# Patient Record
Sex: MALE | Race: WHITE | Employment: OTHER | URBAN - NONMETROPOLITAN AREA
[De-identification: names, ages, dates, MRNs, and addresses within clinical notes are randomized per-mention and may not be internally consistent; named-entity substitution may affect disease eponyms.]

---

## 2018-05-29 ENCOUNTER — HOSPITAL ENCOUNTER (OUTPATIENT)
Dept: CARDIAC REHAB | Age: 63
Setting detail: THERAPIES SERIES
Discharge: HOME | End: 2018-05-29
Payer: MEDICARE

## 2018-05-29 VITALS
HEART RATE: 87 BPM | BODY MASS INDEX: 31.08 KG/M2 | SYSTOLIC BLOOD PRESSURE: 138 MMHG | DIASTOLIC BLOOD PRESSURE: 76 MMHG | HEIGHT: 75 IN | OXYGEN SATURATION: 96 % | WEIGHT: 250 LBS | RESPIRATION RATE: 16 BRPM

## 2018-05-29 ASSESSMENT — PATIENT HEALTH QUESTIONNAIRE - PHQ9: SUM OF ALL RESPONSES TO PHQ QUESTIONS 1-9: 0

## 2018-06-05 ENCOUNTER — HOSPITAL ENCOUNTER (OUTPATIENT)
Dept: CARDIAC REHAB | Age: 63
Setting detail: THERAPIES SERIES
Discharge: HOME | End: 2018-06-05
Payer: OTHER GOVERNMENT

## 2018-06-05 PROCEDURE — 93798 PHYS/QHP OP CAR RHAB W/ECG: CPT

## 2018-06-07 ENCOUNTER — HOSPITAL ENCOUNTER (OUTPATIENT)
Dept: CARDIAC REHAB | Age: 63
Setting detail: THERAPIES SERIES
Discharge: HOME | End: 2018-06-07
Payer: OTHER GOVERNMENT

## 2018-06-07 PROCEDURE — 93798 PHYS/QHP OP CAR RHAB W/ECG: CPT

## 2018-06-12 ENCOUNTER — HOSPITAL ENCOUNTER (OUTPATIENT)
Dept: CARDIAC REHAB | Age: 63
Setting detail: THERAPIES SERIES
Discharge: HOME | End: 2018-06-12
Payer: OTHER GOVERNMENT

## 2018-06-12 PROCEDURE — 93798 PHYS/QHP OP CAR RHAB W/ECG: CPT

## 2018-06-14 ENCOUNTER — HOSPITAL ENCOUNTER (OUTPATIENT)
Dept: CARDIAC REHAB | Age: 63
Setting detail: THERAPIES SERIES
Discharge: HOME | End: 2018-06-14
Payer: OTHER GOVERNMENT

## 2018-06-14 PROCEDURE — 93798 PHYS/QHP OP CAR RHAB W/ECG: CPT

## 2018-06-18 ENCOUNTER — HOSPITAL ENCOUNTER (OUTPATIENT)
Dept: CARDIAC REHAB | Age: 63
Setting detail: THERAPIES SERIES
Discharge: HOME | End: 2018-06-18
Payer: OTHER GOVERNMENT

## 2018-06-18 PROCEDURE — 93798 PHYS/QHP OP CAR RHAB W/ECG: CPT

## 2018-06-20 ENCOUNTER — HOSPITAL ENCOUNTER (OUTPATIENT)
Dept: CARDIAC REHAB | Age: 63
Setting detail: THERAPIES SERIES
Discharge: HOME | End: 2018-06-20
Payer: OTHER GOVERNMENT

## 2018-06-20 PROCEDURE — 93798 PHYS/QHP OP CAR RHAB W/ECG: CPT

## 2018-06-21 ENCOUNTER — HOSPITAL ENCOUNTER (OUTPATIENT)
Dept: CARDIAC REHAB | Age: 63
Setting detail: THERAPIES SERIES
Discharge: HOME | End: 2018-06-21
Payer: OTHER GOVERNMENT

## 2018-06-21 PROCEDURE — 93798 PHYS/QHP OP CAR RHAB W/ECG: CPT

## 2018-06-27 ENCOUNTER — HOSPITAL ENCOUNTER (OUTPATIENT)
Dept: CARDIAC REHAB | Age: 63
Setting detail: THERAPIES SERIES
Discharge: HOME | End: 2018-06-27
Payer: OTHER GOVERNMENT

## 2018-06-27 PROCEDURE — 93798 PHYS/QHP OP CAR RHAB W/ECG: CPT

## 2018-06-28 ENCOUNTER — HOSPITAL ENCOUNTER (OUTPATIENT)
Dept: CARDIAC REHAB | Age: 63
Setting detail: THERAPIES SERIES
Discharge: HOME | End: 2018-06-28
Payer: OTHER GOVERNMENT

## 2018-06-28 PROCEDURE — 93798 PHYS/QHP OP CAR RHAB W/ECG: CPT

## 2018-07-02 ENCOUNTER — HOSPITAL ENCOUNTER (OUTPATIENT)
Dept: CARDIAC REHAB | Age: 63
Setting detail: THERAPIES SERIES
Discharge: HOME | End: 2018-07-02
Payer: OTHER GOVERNMENT

## 2018-07-02 PROCEDURE — 93798 PHYS/QHP OP CAR RHAB W/ECG: CPT

## 2018-07-02 NOTE — PROGRESS NOTES
-increased stamina/strength to 30-50 total exercise by increasing 1-2 level/wk and 1-2   min/wk  to achieve THR and RPE 11-13-pt only at 2.1 METS due to elevated heart rate. Pt had a failed ablation at [facility] and they stopped [facility] Lopressor. [facility] by staff and pateinte. Patient is planning on going to the [address] [place] [address] today.   -introduce weights/ therabands 2-4# for 5-10 reps  -manage BP better-stable  -improved cholesterol and  Triglycerides-no new resutls  -develop regular exercise 30 min daily-pt walking daily.      Physician Changes/Comments:      Cardiac Rehab Staff

## 2018-07-05 ENCOUNTER — HOSPITAL ENCOUNTER (OUTPATIENT)
Dept: CARDIAC REHAB | Age: 63
Setting detail: THERAPIES SERIES
Discharge: HOME | End: 2018-07-05
Payer: OTHER GOVERNMENT

## 2018-07-05 PROCEDURE — 93798 PHYS/QHP OP CAR RHAB W/ECG: CPT

## 2018-07-09 ENCOUNTER — HOSPITAL ENCOUNTER (OUTPATIENT)
Dept: CARDIAC REHAB | Age: 63
Setting detail: THERAPIES SERIES
Discharge: HOME | End: 2018-07-09
Payer: OTHER GOVERNMENT

## 2018-07-09 PROCEDURE — 93798 PHYS/QHP OP CAR RHAB W/ECG: CPT

## 2018-07-11 ENCOUNTER — HOSPITAL ENCOUNTER (OUTPATIENT)
Dept: CARDIAC REHAB | Age: 63
Setting detail: THERAPIES SERIES
Discharge: HOME | End: 2018-07-11
Payer: OTHER GOVERNMENT

## 2018-07-11 PROCEDURE — 93798 PHYS/QHP OP CAR RHAB W/ECG: CPT

## 2018-07-12 ENCOUNTER — HOSPITAL ENCOUNTER (OUTPATIENT)
Dept: CARDIAC REHAB | Age: 63
Setting detail: THERAPIES SERIES
Discharge: HOME | End: 2018-07-12
Payer: OTHER GOVERNMENT

## 2018-07-12 PROCEDURE — 93798 PHYS/QHP OP CAR RHAB W/ECG: CPT

## 2018-07-16 ENCOUNTER — HOSPITAL ENCOUNTER (OUTPATIENT)
Dept: CARDIAC REHAB | Age: 63
Setting detail: THERAPIES SERIES
Discharge: HOME | End: 2018-07-16
Payer: OTHER GOVERNMENT

## 2018-07-16 PROCEDURE — 93798 PHYS/QHP OP CAR RHAB W/ECG: CPT

## 2018-07-18 ENCOUNTER — HOSPITAL ENCOUNTER (OUTPATIENT)
Dept: CARDIAC REHAB | Age: 63
Setting detail: THERAPIES SERIES
Discharge: HOME | End: 2018-07-18
Payer: OTHER GOVERNMENT

## 2018-07-18 PROCEDURE — 93798 PHYS/QHP OP CAR RHAB W/ECG: CPT

## 2018-07-19 ENCOUNTER — HOSPITAL ENCOUNTER (OUTPATIENT)
Dept: CARDIAC REHAB | Age: 63
Setting detail: THERAPIES SERIES
Discharge: HOME | End: 2018-07-19
Payer: OTHER GOVERNMENT

## 2018-07-19 PROCEDURE — 93798 PHYS/QHP OP CAR RHAB W/ECG: CPT

## 2018-07-23 ENCOUNTER — HOSPITAL ENCOUNTER (OUTPATIENT)
Dept: CARDIAC REHAB | Age: 63
Setting detail: THERAPIES SERIES
Discharge: HOME | End: 2018-07-23
Payer: OTHER GOVERNMENT

## 2018-07-23 PROCEDURE — 93798 PHYS/QHP OP CAR RHAB W/ECG: CPT

## 2018-07-25 ENCOUNTER — HOSPITAL ENCOUNTER (OUTPATIENT)
Dept: CARDIAC REHAB | Age: 63
Setting detail: THERAPIES SERIES
Discharge: HOME | End: 2018-07-25
Payer: OTHER GOVERNMENT

## 2018-07-25 PROCEDURE — 93798 PHYS/QHP OP CAR RHAB W/ECG: CPT

## 2018-07-26 ENCOUNTER — HOSPITAL ENCOUNTER (OUTPATIENT)
Dept: CARDIAC REHAB | Age: 63
Setting detail: THERAPIES SERIES
Discharge: HOME | End: 2018-07-26
Payer: OTHER GOVERNMENT

## 2018-07-26 PROCEDURE — 9900000060

## 2018-07-30 ENCOUNTER — HOSPITAL ENCOUNTER (OUTPATIENT)
Dept: CARDIAC REHAB | Age: 63
Setting detail: THERAPIES SERIES
Discharge: HOME | End: 2018-07-30
Payer: OTHER GOVERNMENT

## 2018-07-30 PROCEDURE — 93798 PHYS/QHP OP CAR RHAB W/ECG: CPT

## 2018-07-30 NOTE — PROGRESS NOTES
Phase II Cardiac Rehab Individualized Treatment Plan-60 Day     Patient Name: Prema Brown  Date of Initial Assessment: 7/30/2018  ACCOUNT #: [de-identified]  Diagnosis: Combined systolic and diastolic CHF   Onset Date: 01/01/18  Referring Physician: Trevor Kim  Risk Stratification: High  Session Number: 21       EXERCISE    Stages of Change:   [] pre-contemplation   [x] Action   [] Contemplate   [] Maintainence   [x] Prep   [] Relapse          Exercise Prescription:  Mode: [x] TM [x] B [x] STP [] EL [x] R  Frequency: 3 x week  Duration: 31-60 min   Intensity: METS 2.7  Plan/Goal: Increase 1-2 levels/week or 1-2 min/week to achieve target HR and RPE   11-13. Progression: Progressed from 2.1 to 2.7. Has been klimited due to elevated heart rate. [facility] not[facility], no new orders           Target HR     Maximum      [] Angina with Exertion THR:    [] Resistance Training Weight (lbs):  2 Reps: 10-15    Hypertension:  [x] Yes  [] No  Resting BP: 148/82  Peak Exercise BP: 150/82  [] Med change? Intervention:  Home Exercise:  Type: Walking   Duration: 30 min   Frequency: Daily    [x] Resistance Training    Education:   [x] Equipment Naper  [x] Self pulse   [x] Proper use weights/therabands   [x] S/S to report  [] Low Na Diet    [x] Warm up/ Cool down  [] BP Medication    [x] RPE Scale   [] Understand BP   [x] Ex Safety   [] Exercise specialist class-Home Exercise       Target Goal:   -Individual Exercise Plan  -BP<140/90 or <130/80 if DM   -Aerobic active 30 + minutes 5-7 days per week    Nutrition    Stages of Change:   [] pre-contemplation   [x] Action   [] Contemplate   [] Maintainence   [x] Prep   [] Relapse    Lipids: Total Cholesterol:  - no new results  Triglycerides:   HDL:   LDL:   [] Med Change? Diabetes:  [] Yes  [x] No  Random BS:  HbA1c:  [] Med Change?     Weight Management:  Wt Goal: 1-2 lbs/wk    Intervention:   [] Dietitian Consult       [] Nurse/Patient Discussion     [] Diet RPE 11-13-pt only at 2.1 METS due to elevated heart rate. Pt had a failed ablation at [address] [facility] and they stopped his Lopressor. [facility] by staff and pateinte.   Patient is planning on going to the [facility] in am.     -introduce weights/ therabands 2-4# for 5-10 reps  -manage BP better-stable  -improved cholesterol and  Triglycerides-no new resutls  -develop regular exercise 30 min daily-pt walking daily.        Physician Changes/Comments:      Cardiac Rehab Staff

## 2018-08-01 ENCOUNTER — HOSPITAL ENCOUNTER (OUTPATIENT)
Dept: CARDIAC REHAB | Age: 63
Setting detail: THERAPIES SERIES
Discharge: HOME | End: 2018-08-01
Payer: OTHER GOVERNMENT

## 2018-08-01 PROCEDURE — 93798 PHYS/QHP OP CAR RHAB W/ECG: CPT

## 2018-08-02 ENCOUNTER — HOSPITAL ENCOUNTER (OUTPATIENT)
Dept: CARDIAC REHAB | Age: 63
Setting detail: THERAPIES SERIES
Discharge: HOME | End: 2018-08-02
Payer: OTHER GOVERNMENT

## 2018-08-02 PROCEDURE — 93798 PHYS/QHP OP CAR RHAB W/ECG: CPT

## 2018-08-06 ENCOUNTER — HOSPITAL ENCOUNTER (OUTPATIENT)
Dept: CARDIAC REHAB | Age: 63
Setting detail: THERAPIES SERIES
Discharge: HOME | End: 2018-08-06
Payer: OTHER GOVERNMENT

## 2018-08-06 PROCEDURE — 93798 PHYS/QHP OP CAR RHAB W/ECG: CPT

## 2018-08-08 ENCOUNTER — HOSPITAL ENCOUNTER (OUTPATIENT)
Dept: CARDIAC REHAB | Age: 63
Setting detail: THERAPIES SERIES
Discharge: HOME | End: 2018-08-08
Payer: OTHER GOVERNMENT

## 2018-08-08 PROCEDURE — 93798 PHYS/QHP OP CAR RHAB W/ECG: CPT

## 2018-08-13 ENCOUNTER — HOSPITAL ENCOUNTER (OUTPATIENT)
Dept: CARDIAC REHAB | Age: 63
Setting detail: THERAPIES SERIES
Discharge: HOME | End: 2018-08-13
Payer: OTHER GOVERNMENT

## 2018-08-13 PROCEDURE — 93798 PHYS/QHP OP CAR RHAB W/ECG: CPT

## 2018-08-15 ENCOUNTER — HOSPITAL ENCOUNTER (OUTPATIENT)
Dept: CARDIAC REHAB | Age: 63
Setting detail: THERAPIES SERIES
Discharge: HOME | End: 2018-08-15
Payer: OTHER GOVERNMENT

## 2018-08-15 PROCEDURE — 93798 PHYS/QHP OP CAR RHAB W/ECG: CPT

## 2018-08-16 ENCOUNTER — HOSPITAL ENCOUNTER (OUTPATIENT)
Dept: CARDIAC REHAB | Age: 63
Setting detail: THERAPIES SERIES
Discharge: HOME | End: 2018-08-16
Payer: OTHER GOVERNMENT

## 2018-08-16 PROCEDURE — 93798 PHYS/QHP OP CAR RHAB W/ECG: CPT

## 2018-08-20 ENCOUNTER — HOSPITAL ENCOUNTER (OUTPATIENT)
Dept: CARDIAC REHAB | Age: 63
Setting detail: THERAPIES SERIES
Discharge: HOME | End: 2018-08-20
Payer: OTHER GOVERNMENT

## 2018-08-20 PROCEDURE — 93798 PHYS/QHP OP CAR RHAB W/ECG: CPT

## 2018-08-22 ENCOUNTER — HOSPITAL ENCOUNTER (OUTPATIENT)
Dept: CARDIAC REHAB | Age: 63
Setting detail: THERAPIES SERIES
Discharge: HOME | End: 2018-08-22
Payer: OTHER GOVERNMENT

## 2018-08-22 PROCEDURE — 93798 PHYS/QHP OP CAR RHAB W/ECG: CPT

## 2018-08-23 ENCOUNTER — HOSPITAL ENCOUNTER (OUTPATIENT)
Dept: CARDIAC REHAB | Age: 63
Setting detail: THERAPIES SERIES
Discharge: HOME | End: 2018-08-23
Payer: OTHER GOVERNMENT

## 2018-08-23 PROCEDURE — 93798 PHYS/QHP OP CAR RHAB W/ECG: CPT

## 2018-08-27 ENCOUNTER — HOSPITAL ENCOUNTER (OUTPATIENT)
Dept: CARDIAC REHAB | Age: 63
Setting detail: THERAPIES SERIES
Discharge: HOME | End: 2018-08-27
Payer: OTHER GOVERNMENT

## 2018-08-27 PROCEDURE — 93798 PHYS/QHP OP CAR RHAB W/ECG: CPT

## 2018-08-28 ENCOUNTER — OFFICE VISIT (OUTPATIENT)
Dept: PODIATRY | Age: 63
End: 2018-08-28
Payer: MEDICARE

## 2018-08-28 ENCOUNTER — HOSPITAL ENCOUNTER (OUTPATIENT)
Age: 63
Discharge: HOME | End: 2018-08-30
Payer: OTHER GOVERNMENT

## 2018-08-28 ENCOUNTER — HOSPITAL ENCOUNTER (OUTPATIENT)
Dept: GENERAL RADIOLOGY | Age: 63
Discharge: HOME | End: 2018-08-30
Payer: OTHER GOVERNMENT

## 2018-08-28 VITALS
TEMPERATURE: 98.1 F | BODY MASS INDEX: 32.05 KG/M2 | HEIGHT: 75 IN | DIASTOLIC BLOOD PRESSURE: 78 MMHG | RESPIRATION RATE: 18 BRPM | SYSTOLIC BLOOD PRESSURE: 132 MMHG | HEART RATE: 99 BPM

## 2018-08-28 DIAGNOSIS — M79.671 PAIN OF RIGHT HEEL: ICD-10-CM

## 2018-08-28 DIAGNOSIS — M79.671 PAIN OF RIGHT HEEL: Primary | ICD-10-CM

## 2018-08-28 DIAGNOSIS — M77.31 HEEL SPUR, RIGHT: ICD-10-CM

## 2018-08-28 PROCEDURE — G8427 DOCREV CUR MEDS BY ELIG CLIN: HCPCS | Performed by: PODIATRIST

## 2018-08-28 PROCEDURE — 99203 OFFICE O/P NEW LOW 30 MIN: CPT | Performed by: PODIATRIST

## 2018-08-28 PROCEDURE — G8417 CALC BMI ABV UP PARAM F/U: HCPCS | Performed by: PODIATRIST

## 2018-08-28 PROCEDURE — 73630 X-RAY EXAM OF FOOT: CPT

## 2018-08-28 PROCEDURE — 1036F TOBACCO NON-USER: CPT | Performed by: PODIATRIST

## 2018-08-28 PROCEDURE — 3017F COLORECTAL CA SCREEN DOC REV: CPT | Performed by: PODIATRIST

## 2018-08-28 NOTE — PROGRESS NOTES
Subjective:      Patient ID: Waldo Alejandro is a 57 y.o. male. Cc: Right heel -- > 3 months        (NLDOCAT)  burning / tearing                               OOB /API , now all day   HPI previous insoles          Changed 3 months ago ; no response          Overall ; non traumatic insidious onset          Ice / stretching hx     Review of Systems -constitutional                                   -DM / -HTN                                   +valve replacement ; Coumadin therapy       MRR:   Past Medical History:   Diagnosis Date    Abnormal echocardiogram 3/12, 9/17/12    at least mild-moderate prosthetic mitral valve stenosis. mean gradient of 9-13 mm mercury. Mild MR. EF 60-65%.  CHF (congestive heart failure) (HCC)     Essential hypertension     Generalized osteoarthritis of multiple sites     GERD (gastroesophageal reflux disease)     H/O mitral valve replacement with tissue graft 2012    Bovine Valve    Mixed hyperlipidemia     Paroxysmal atrial flutter ([facility] 10/1/12    Spontaneous resolution. History of A. fib surrounding mitral valve surgery.     Sleep apnea     Ulcerative colitis ([facility]      Past Surgical History:   Procedure Laterality Date    CARDIAC SURGERY      HERNIA REPAIR      MITRAL VALVE REPLACEMENT  2012    Bovine    SHOULDER SURGERY Right     right dislocation     Allergies   Allergen Reactions    Statins Support Therapy      Patient states gets a lot of muscle aches       Current Outpatient Prescriptions:     magnesium oxide (MAG-OX) 400 MG tablet, Take 420 mg by mouth every other day, Disp: , Rfl:     predniSONE (DELTASONE) 10 MG tablet, 4 tabs daily for 3 days, 3 tabs daily for 3 days, 2 tabs daily for 3 days, 1 tabs daily for 3 days, Disp: 30 tablet, Rfl: 0    metoprolol tartrate (LOPRESSOR) 25 MG tablet, Take 1 tablet by mouth 2 times daily, Disp: 60 tablet, Rfl: 3    spironolactone (ALDACTONE) 25 MG tablet, Take 25 mg by mouth daily, Disp: , Rfl:     metolazone (ZAROXOLYN) 2.5 MG tablet, Take 2.5 mg by mouth daily, Disp: , Rfl:     vitamin D 1000 units CAPS, Take by mouth, Disp: , Rfl:     b complex vitamins capsule, Take 1 capsule by mouth daily, Disp: , Rfl:     Calcium Polycarbophil (FIBER-CAPS PO), Take by mouth, Disp: , Rfl:     Lactobacillus (PROBIOTIC ACIDOPHILUS PO), Take by mouth, Disp: , Rfl:     warfarin (COUMADIN) 5 MG tablet, 5 mg 5 days weekly and 7,5 mg 2 days weekly. , Disp: 30 tablet, Rfl: 3    furosemide (LASIX) 40 MG tablet, Take 1 tablet by mouth daily, Disp: 60 tablet, Rfl: 3    omeprazole (PRILOSEC) 20 MG capsule, Take 20 mg by mouth daily. , Disp: , Rfl:     sulfaSALAzine (AZULFIDINE) 500 MG tablet, Take 1,000 mg by mouth 4 times daily. , Disp: , Rfl:   Family History   Problem Relation Age of Onset    Cancer Mother     Diabetes Father      Social History     Social History    Marital status:      Spouse name: N/A    Number of children: N/A    Years of education: N/A     Occupational History    Not on file. Social History Main Topics    Smoking status: Never Smoker    Smokeless tobacco: Never Used    Alcohol use Yes      Comment: ocassional social drinker    Drug use: No    Sexual activity: Yes     Partners: Female     Other Topics Concern    Not on file     Social History Narrative    No narrative on file   +retired ; lives independently @ home   -international travel , < 6 months  -pets      Objective:   Physical Exam 56 Y/O WM , WD/WN, A&O x 3                            VSS, Afebrile, NAD,             LLE ; -Tinel's                        Flexible R.O.M.  Hindfoot                        MS +5/5                       +2/4 readily palpable pedal pulses                        +PMT on medial tubercle of heel              X-ray ; tiny plantar inflammatory spur                          DJD 1st MPJ     Assessment:      Heel spur syndrome ; pain -- Left     REC: Tier 1 algorithm ; including night splint / ice - stretching         Plan:

## 2018-08-28 NOTE — PATIENT INSTRUCTIONS
Patient Discharge Instructions    CALL [phone] for questions regarding care. OFFICE HOURS ARE WED AND Thursday 8 A. M. TO 4:30 P. M. And subject to change without notice    Discharge instructions for the patient's plan of care. Right heel   Power step insert. Xray today of right foot. Return to clinic Tuesday 9/11/18 at 11:30AM  [facility] appointment to follow at 11:45AM      Next appointment:  Future Appointments  Date Time Provider Chelly Dixon   8/28/2018 9:30 AM Nino Cross DPM [facility]   8/29/2018 8:30 AM [facility] CARDIOPULM REHAB RM 1 [facility] CARDIAC [place]   8/30/2018 8:30 AM [facility] CARDIOPULM REHAB RM 1 M[facility]Z CARDIAC [place]   1/14/2019 9:00 AM MD Kay Humphrey         Comments: We hope we gave you VERY GOOD care today!

## 2018-08-29 ENCOUNTER — HOSPITAL ENCOUNTER (OUTPATIENT)
Dept: CARDIAC REHAB | Age: 63
Setting detail: THERAPIES SERIES
Discharge: HOME | End: 2018-08-29
Payer: OTHER GOVERNMENT

## 2018-08-29 PROCEDURE — 93798 PHYS/QHP OP CAR RHAB W/ECG: CPT

## 2018-08-29 NOTE — PROGRESS NOTES
Education     Education:  [] S&S hypo/hyperglycemia  [x] Low fat/low cholesterol diet  [] Weight loss methods      [] Relate Diabetes/CAD     [x] Eating heart healthy handout    Target Goal:  -LDL-C<100 if triglycerides are > 200  -LDL-C < 70 for high risk patients  -HbA1c < 7%  -BMI < 25   Education    Stages of Change:    [] pre-contemplation   [x] Action   [] Contemplate   [] Maintainence   [x] Prep   [] Relapse    Family support: [x] Yes  [] No    Tobacco use: [] Yes  [x] No    Intervention:  [] Referred to smoking cessation counselor     [] Individual education and counseling  [] Tobacco adjunct  [] Informed of education class schedule     Education:   [x] Risk Factors/Modifications  [] Psychological aspects  [x] Angina    [] Medications  [x] CHF      [] Cardiac A&P    Target Goal:  -Complete cessation of tobacco use (if applicable)  -Continued risk factor modifications  -Recognizing signs/symptoms to report  -Proper use of meds    Psychosocial  Stages of Change:    [] pre-contemplation   [x] Action   [] Contemplate   [] Maintainence   [x] Prep   [] Relapse    Intervention:   [] Psych Consult/  [x] Uses stress management skills    [] Physician Referral    [] Stress management class   [] Med Change? Education:    [] Coping Techniques   [] Relaxation techniques   [] S/S of Depression    Target Goal:  -Assess presence or absence of depression using a valid screening tool. -Maximize coping skills.  -Positive support system.     Preventative Medication:   [] Aspirin       [x] Beta Blockade      [] Statin or other lipid lowering agent     [x] Clopidogrel   [x] ACE Inhibitor   [] Other anticoagulation medications     Fall Risk assess: [x] Yes  [] No  Assistive Device:   [] Cane  [] Walker [] Wheel Chair  [] Gait belt    Patient/Program goal:   -increased stamina/strength to 30-50 total exercise by increasing 1-2 level/wk and 1-2   min/wk  to achieve THR and RPE 11-13-pt only at 2.7 METS due to

## 2018-08-30 ENCOUNTER — HOSPITAL ENCOUNTER (OUTPATIENT)
Dept: CARDIAC REHAB | Age: 63
Setting detail: THERAPIES SERIES
Discharge: HOME | End: 2018-08-30
Payer: OTHER GOVERNMENT

## 2018-08-30 PROCEDURE — 93798 PHYS/QHP OP CAR RHAB W/ECG: CPT

## 2018-09-05 ENCOUNTER — HOSPITAL ENCOUNTER (OUTPATIENT)
Dept: CARDIAC REHAB | Age: 63
Setting detail: THERAPIES SERIES
Discharge: HOME | End: 2018-09-05
Payer: OTHER GOVERNMENT

## 2018-09-05 PROCEDURE — 93798 PHYS/QHP OP CAR RHAB W/ECG: CPT

## 2018-09-06 ENCOUNTER — HOSPITAL ENCOUNTER (OUTPATIENT)
Dept: CARDIAC REHAB | Age: 63
Setting detail: THERAPIES SERIES
Discharge: HOME | End: 2018-09-06
Payer: OTHER GOVERNMENT

## 2018-09-06 PROCEDURE — 93798 PHYS/QHP OP CAR RHAB W/ECG: CPT

## 2018-09-06 NOTE — PROGRESS NOTES
Phase II Cardiac Rehab Individualized Treatment Plan-Discharge    Patient Name: Jorden Burleson  Date of Initial Assessment: 9/6/2018  ACCOUNT #: [de-identified]  Diagnosis: Combined systolic and diastolic failure   Onset Date: 01/01/18  [facility]  Risk Stratification: High  Session Number: 36   EXERCISE    Stages of Change:   [] pre-contemplation   [] Action   [] Contemplate   [x] Maintainence   [] Prep   [] Relapse          Exercise Prescription:  Mode: [x] TM [x] B [x] STP [] EL [x] R  Frequency: 3 x week  Duration: 31-60 min   Intensity: METS 2.8  Progression: Progressed from 2.0 to 2.8 and increased time on both machines. [] Angina with Exertion THR:    [] Resistance Training Weight (lbs):  5 Reps: 10-15    Hypertension:  [x] Yes  [] No  Resting BP: 122/78  Peak Exercise BP: 136/82  [] Med Change? Intervention:  Home Exercise:  Type: walking   Duration: 30  Min   Frequency: daily -pt is trying to walk everyday, limited some due to foot issues, having surgery in a week. [x] Resistance Training    Depression Screening:  [] Have you been feeling sad. ..down in the dumps? [] Have you lost interest in your job, sports, hobbies, friends? [x] Do you often feel tired? [] Do you have trouble sleeping or do you sleep too much? [x] Have you been gaining or losing weight? [] Do you often feel down on yourself, that everything is your fault? [x] Do you have troubled making decisions or concentrating on your work? [] Do you often feel agitated or like you can barely move? [] Do you ever feel that life isn't worth living?    *If greater than 5 symptoms listed,  notified. Education:   [x] Education Goals met        Target Goal:   -Individual Exercise Plan  -BP<140/90 or <130/80 if DM   -Aerobic active 30 + minutes 5-7 days per week    Nutrition    Stages of Change:   [] pre-contemplation   [] Action   [] Contemplate   [x] Maintainenc   [] Prep   [] Relapse    Lipids:    Total

## 2018-09-18 ENCOUNTER — OFFICE VISIT (OUTPATIENT)
Dept: PODIATRY | Age: 63
End: 2018-09-18
Payer: MEDICARE

## 2018-09-18 VITALS
SYSTOLIC BLOOD PRESSURE: 122 MMHG | DIASTOLIC BLOOD PRESSURE: 66 MMHG | RESPIRATION RATE: 18 BRPM | HEART RATE: 96 BPM | BODY MASS INDEX: 32.48 KG/M2 | TEMPERATURE: 97.9 F | HEIGHT: 74 IN

## 2018-09-18 DIAGNOSIS — M79.671 PAIN OF RIGHT HEEL: ICD-10-CM

## 2018-09-18 DIAGNOSIS — M77.31 HEEL SPUR, RIGHT: Primary | ICD-10-CM

## 2018-09-18 PROCEDURE — 1036F TOBACCO NON-USER: CPT | Performed by: PODIATRIST

## 2018-09-18 PROCEDURE — 99212 OFFICE O/P EST SF 10 MIN: CPT | Performed by: PODIATRIST

## 2018-09-18 PROCEDURE — 20550 NJX 1 TENDON SHEATH/LIGAMENT: CPT | Performed by: PODIATRIST

## 2018-09-18 PROCEDURE — 3017F COLORECTAL CA SCREEN DOC REV: CPT | Performed by: PODIATRIST

## 2018-09-18 PROCEDURE — G8417 CALC BMI ABV UP PARAM F/U: HCPCS | Performed by: PODIATRIST

## 2018-09-18 PROCEDURE — G8427 DOCREV CUR MEDS BY ELIG CLIN: HCPCS | Performed by: PODIATRIST

## 2018-09-18 NOTE — PROGRESS NOTES
Subjective:      Patient ID: Adolfo Hendrix is a 57 y.o. male. Cc: Right heel          Sore ; but smaller area   HPI 2nd visit in treatment cycle         Hx X-ray          Hx Tier 1 algorithm initiated ; including OTC \"powerStep\"        Review of Systems -constitutional                                   -weakness / -numbness                                   -claudication , -DVT , -angina , -DM   MRR:   Past Medical History:   Diagnosis Date    Abnormal echocardiogram 3/12, 9/17/12    at least mild-moderate prosthetic mitral valve stenosis. mean gradient of 9-13 mm mercury. Mild MR. EF 60-65%.  CHF (congestive heart failure) (HCC)     Essential hypertension     Generalized osteoarthritis of multiple sites     GERD (gastroesophageal reflux disease)     H/O mitral valve replacement with tissue graft 2012    Bovine Valve    Mixed hyperlipidemia     Paroxysmal atrial flutter ([facility] 10/1/12    Spontaneous resolution. History of A. fib surrounding mitral valve surgery.     Sleep apnea     Ulcerative colitis ([facility]      Past Surgical History:   Procedure Laterality Date    CARDIAC SURGERY      HERNIA REPAIR      MITRAL VALVE REPLACEMENT  2012    Bovine    SHOULDER SURGERY Right     right dislocation     Allergies   Allergen Reactions    Statins Support Therapy      Patient states gets a lot of muscle aches       Current Outpatient Prescriptions:     magnesium oxide (MAG-OX) 400 MG tablet, Take 420 mg by mouth every other day, Disp: , Rfl:     metoprolol tartrate (LOPRESSOR) 25 MG tablet, Take 1 tablet by mouth 2 times daily, Disp: 60 tablet, Rfl: 3    spironolactone (ALDACTONE) 25 MG tablet, Take 25 mg by mouth daily, Disp: , Rfl:     metolazone (ZAROXOLYN) 2.5 MG tablet, Take 2.5 mg by mouth daily, Disp: , Rfl:     vitamin D 1000 units CAPS, Take by mouth, Disp: , Rfl:     b complex vitamins capsule, Take 1 capsule by mouth daily, Disp: , Rfl:     Calcium Polycarbophil (FIBER-CAPS PO), Take by mouth, TRISHA Oviedo   9/18/2018  11:37 AM

## 2019-01-09 RX ORDER — LIDOCAINE HYDROCHLORIDE 10 MG/ML
INJECTION, SOLUTION INFILTRATION; PERINEURAL
Qty: 5 ML | Refills: 0 | OUTPATIENT
Start: 2019-01-09 | End: 2019-10-16 | Stop reason: ALTCHOICE

## 2019-01-09 RX ORDER — BETAMETHASONE SODIUM PHOSPHATE AND BETAMETHASONE ACETATE 3; 3 MG/ML; MG/ML
INJECTION, SUSPENSION INTRA-ARTICULAR; INTRALESIONAL; INTRAMUSCULAR; SOFT TISSUE
Qty: 1 VIAL | Refills: 0 | OUTPATIENT
Start: 2019-01-09 | End: 2019-10-16 | Stop reason: ALTCHOICE

## 2019-10-16 ENCOUNTER — OFFICE VISIT (OUTPATIENT)
Dept: UROLOGY | Age: 64
End: 2019-10-16
Payer: MEDICARE

## 2019-10-16 VITALS
SYSTOLIC BLOOD PRESSURE: 102 MMHG | TEMPERATURE: 98.3 F | BODY MASS INDEX: 33.11 KG/M2 | WEIGHT: 258 LBS | DIASTOLIC BLOOD PRESSURE: 60 MMHG | HEIGHT: 74 IN

## 2019-10-16 DIAGNOSIS — N52.9 ERECTILE DYSFUNCTION, UNSPECIFIED ERECTILE DYSFUNCTION TYPE: Primary | ICD-10-CM

## 2019-10-16 PROCEDURE — 1123F ACP DISCUSS/DSCN MKR DOCD: CPT | Performed by: NURSE PRACTITIONER

## 2019-10-16 PROCEDURE — 3017F COLORECTAL CA SCREEN DOC REV: CPT | Performed by: NURSE PRACTITIONER

## 2019-10-16 PROCEDURE — 1036F TOBACCO NON-USER: CPT | Performed by: NURSE PRACTITIONER

## 2019-10-16 PROCEDURE — G8417 CALC BMI ABV UP PARAM F/U: HCPCS | Performed by: NURSE PRACTITIONER

## 2019-10-16 PROCEDURE — G8427 DOCREV CUR MEDS BY ELIG CLIN: HCPCS | Performed by: NURSE PRACTITIONER

## 2019-10-16 PROCEDURE — 4040F PNEUMOC VAC/ADMIN/RCVD: CPT | Performed by: NURSE PRACTITIONER

## 2019-10-16 PROCEDURE — 99204 OFFICE O/P NEW MOD 45 MIN: CPT | Performed by: NURSE PRACTITIONER

## 2019-10-16 PROCEDURE — G8484 FLU IMMUNIZE NO ADMIN: HCPCS | Performed by: NURSE PRACTITIONER

## 2019-10-16 RX ORDER — PREDNISONE 10 MG/1
10 TABLET ORAL DAILY
COMMUNITY
End: 2020-04-13 | Stop reason: SDUPTHER

## 2019-10-16 RX ORDER — POTASSIUM CHLORIDE 750 MG/1
10 CAPSULE, EXTENDED RELEASE ORAL 2 TIMES DAILY PRN
COMMUNITY
End: 2021-01-20

## 2019-11-19 ASSESSMENT — ENCOUNTER SYMPTOMS
WHEEZING: 0
BACK PAIN: 0
ABDOMINAL PAIN: 0
NAUSEA: 0
COUGH: 0
SHORTNESS OF BREATH: 0
VOMITING: 0
EYE PAIN: 0
EYE REDNESS: 0
CONSTIPATION: 0
COLOR CHANGE: 0

## 2020-03-30 ENCOUNTER — HOSPITAL ENCOUNTER (OUTPATIENT)
Dept: GENERAL RADIOLOGY | Age: 65
Discharge: HOME | End: 2020-04-01
Payer: MEDICARE

## 2020-03-30 ENCOUNTER — HOSPITAL ENCOUNTER (OUTPATIENT)
Age: 65
Discharge: HOME | End: 2020-04-01
Payer: MEDICARE

## 2020-03-30 PROCEDURE — 73630 X-RAY EXAM OF FOOT: CPT

## 2020-06-04 ENCOUNTER — HOSPITAL ENCOUNTER (EMERGENCY)
Age: 65
Discharge: HOME | End: 2020-06-05
Attending: EMERGENCY MEDICINE
Payer: MEDICARE

## 2020-06-04 LAB
ABSOLUTE EOS #: <0.03 K/UL (ref 0–0.44)
ABSOLUTE IMMATURE GRANULOCYTE: 0.03 K/UL (ref 0–0.3)
ABSOLUTE LYMPH #: 0.97 K/UL (ref 1.1–3.7)
ABSOLUTE MONO #: 0.65 K/UL (ref 0.1–1.2)
BASOPHILS # BLD: 0 % (ref 0–2)
BASOPHILS ABSOLUTE: 0.03 K/UL (ref 0–0.2)
DIFFERENTIAL TYPE: ABNORMAL
EOSINOPHILS RELATIVE PERCENT: 0 % (ref 1–4)
HCT VFR BLD CALC: 53 % (ref 40.7–50.3)
HEMOGLOBIN: 17.1 G/DL (ref 13–17)
IMMATURE GRANULOCYTES: 0 %
LYMPHOCYTES # BLD: 9 % (ref 24–43)
MCH RBC QN AUTO: 29.2 PG (ref 25.2–33.5)
MCHC RBC AUTO-ENTMCNC: 32.3 G/DL (ref 28.4–34.8)
MCV RBC AUTO: 90.4 FL (ref 82.6–102.9)
MONOCYTES # BLD: 6 % (ref 3–12)
NRBC AUTOMATED: 0 PER 100 WBC
PDW BLD-RTO: 15.8 % (ref 11.8–14.4)
PLATELET # BLD: 285 K/UL (ref 138–453)
PLATELET ESTIMATE: ABNORMAL
PMV BLD AUTO: 10.9 FL (ref 8.1–13.5)
RBC # BLD: 5.86 M/UL (ref 4.21–5.77)
RBC # BLD: ABNORMAL 10*6/UL
SEG NEUTROPHILS: 84 % (ref 36–65)
SEGMENTED NEUTROPHILS ABSOLUTE COUNT: 9.17 K/UL (ref 1.5–8.1)
WBC # BLD: 10.9 K/UL (ref 3.5–11.3)
WBC # BLD: ABNORMAL 10*3/UL

## 2020-06-04 PROCEDURE — 85025 COMPLETE CBC W/AUTO DIFF WBC: CPT

## 2020-06-04 PROCEDURE — 83690 ASSAY OF LIPASE: CPT

## 2020-06-04 PROCEDURE — 83880 ASSAY OF NATRIURETIC PEPTIDE: CPT

## 2020-06-04 PROCEDURE — 85730 THROMBOPLASTIN TIME PARTIAL: CPT

## 2020-06-04 PROCEDURE — 83605 ASSAY OF LACTIC ACID: CPT

## 2020-06-04 PROCEDURE — 80048 BASIC METABOLIC PNL TOTAL CA: CPT

## 2020-06-04 PROCEDURE — 36415 COLL VENOUS BLD VENIPUNCTURE: CPT

## 2020-06-04 PROCEDURE — 85610 PROTHROMBIN TIME: CPT

## 2020-06-04 PROCEDURE — 99291 CRITICAL CARE FIRST HOUR: CPT

## 2020-06-04 PROCEDURE — 84484 ASSAY OF TROPONIN QUANT: CPT

## 2020-06-04 PROCEDURE — 2500000003 HC RX 250 WO HCPCS

## 2020-06-04 PROCEDURE — 80076 HEPATIC FUNCTION PANEL: CPT

## 2020-06-04 PROCEDURE — 93005 ELECTROCARDIOGRAM TRACING: CPT | Performed by: EMERGENCY MEDICINE

## 2020-06-04 RX ORDER — DILTIAZEM HYDROCHLORIDE 5 MG/ML
INJECTION INTRAVENOUS
Status: COMPLETED
Start: 2020-06-04 | End: 2020-06-04

## 2020-06-04 RX ORDER — DILTIAZEM HYDROCHLORIDE 5 MG/ML
20 INJECTION INTRAVENOUS ONCE
Status: COMPLETED | OUTPATIENT
Start: 2020-06-03 | End: 2020-06-04

## 2020-06-04 RX ORDER — 0.9 % SODIUM CHLORIDE 0.9 %
500 INTRAVENOUS SOLUTION INTRAVENOUS ONCE
Status: COMPLETED | OUTPATIENT
Start: 2020-06-04 | End: 2020-06-05

## 2020-06-04 RX ORDER — DILTIAZEM HYDROCHLORIDE 5 MG/ML
INJECTION INTRAVENOUS
Status: DISCONTINUED
Start: 2020-06-04 | End: 2020-06-05 | Stop reason: HOSPADM

## 2020-06-04 RX ADMIN — DILTIAZEM HYDROCHLORIDE 20 MG: 5 INJECTION INTRAVENOUS at 02:33

## 2020-06-04 ASSESSMENT — PAIN DESCRIPTION - DESCRIPTORS: DESCRIPTORS: PRESSURE

## 2020-06-04 ASSESSMENT — PAIN DESCRIPTION - PAIN TYPE: TYPE: ACUTE PAIN

## 2020-06-04 ASSESSMENT — PAIN DESCRIPTION - FREQUENCY: FREQUENCY: CONTINUOUS

## 2020-06-04 ASSESSMENT — PAIN DESCRIPTION - LOCATION: LOCATION: CHEST

## 2020-06-04 ASSESSMENT — PAIN SCALES - GENERAL: PAINLEVEL_OUTOF10: 10

## 2020-06-04 ASSESSMENT — PAIN DESCRIPTION - ORIENTATION: ORIENTATION: MID

## 2020-06-05 ENCOUNTER — APPOINTMENT (OUTPATIENT)
Dept: CT IMAGING | Age: 65
End: 2020-06-05
Payer: MEDICARE

## 2020-06-05 VITALS
OXYGEN SATURATION: 93 % | SYSTOLIC BLOOD PRESSURE: 106 MMHG | HEART RATE: 117 BPM | BODY MASS INDEX: 32.74 KG/M2 | WEIGHT: 255 LBS | RESPIRATION RATE: 18 BRPM | DIASTOLIC BLOOD PRESSURE: 56 MMHG

## 2020-06-05 LAB
-: ABNORMAL
ALBUMIN SERPL-MCNC: 4.7 G/DL (ref 3.5–5.2)
ALBUMIN/GLOBULIN RATIO: 1.2 (ref 1–2.5)
ALP BLD-CCNC: 112 U/L (ref 40–129)
ALT SERPL-CCNC: 23 U/L (ref 5–41)
AMORPHOUS: ABNORMAL
ANION GAP SERPL CALCULATED.3IONS-SCNC: 11 MMOL/L (ref 9–17)
AST SERPL-CCNC: 35 U/L
BACTERIA: ABNORMAL
BILIRUB SERPL-MCNC: 0.53 MG/DL (ref 0.3–1.2)
BILIRUBIN DIRECT: <0.08 MG/DL
BILIRUBIN URINE: NEGATIVE
BILIRUBIN, INDIRECT: ABNORMAL MG/DL (ref 0–1)
BNP INTERPRETATION: ABNORMAL
BUN BLDV-MCNC: 26 MG/DL (ref 8–23)
BUN/CREAT BLD: 23 (ref 9–20)
CALCIUM SERPL-MCNC: 9.7 MG/DL (ref 8.6–10.4)
CASTS UA: ABNORMAL /LPF
CHLORIDE BLD-SCNC: 95 MMOL/L (ref 98–107)
CO2: 31 MMOL/L (ref 20–31)
COLOR: YELLOW
COMMENT UA: ABNORMAL
CREAT SERPL-MCNC: 1.14 MG/DL (ref 0.7–1.2)
CRYSTALS, UA: ABNORMAL /HPF
EKG ATRIAL RATE: 416 BPM
EKG Q-T INTERVAL: 366 MS
EKG QRS DURATION: 132 MS
EKG QTC CALCULATION (BAZETT): 514 MS
EKG R AXIS: -134 DEGREES
EKG T AXIS: 23 DEGREES
EKG VENTRICULAR RATE: 119 BPM
EPITHELIAL CELLS UA: ABNORMAL /HPF (ref 0–5)
GFR AFRICAN AMERICAN: >60 ML/MIN
GFR NON-AFRICAN AMERICAN: >60 ML/MIN
GFR SERPL CREATININE-BSD FRML MDRD: ABNORMAL ML/MIN/{1.73_M2}
GFR SERPL CREATININE-BSD FRML MDRD: ABNORMAL ML/MIN/{1.73_M2}
GLOBULIN: ABNORMAL G/DL (ref 1.5–3.8)
GLUCOSE BLD-MCNC: 175 MG/DL (ref 70–99)
GLUCOSE URINE: ABNORMAL
INR BLD: 2.1
KETONES, URINE: NEGATIVE
LACTIC ACID: 2.1 MMOL/L (ref 0.5–2.2)
LEUKOCYTE ESTERASE, URINE: NEGATIVE
LIPASE: 22 U/L (ref 13–60)
MUCUS: ABNORMAL
NITRITE, URINE: NEGATIVE
OTHER OBSERVATIONS UA: ABNORMAL
PARTIAL THROMBOPLASTIN TIME: 36.8 SEC (ref 24–36)
PH UA: 5.5 (ref 5–9)
POTASSIUM SERPL-SCNC: 4.4 MMOL/L (ref 3.7–5.3)
PRO-BNP: 455 PG/ML
PROTEIN UA: ABNORMAL
PROTHROMBIN TIME: 23.3 SEC (ref 11.8–14.6)
RBC UA: ABNORMAL /HPF (ref 0–2)
RENAL EPITHELIAL, UA: ABNORMAL /HPF
SODIUM BLD-SCNC: 137 MMOL/L (ref 135–144)
SPECIFIC GRAVITY UA: 1.01 (ref 1.01–1.02)
TOTAL PROTEIN: 8.5 G/DL (ref 6.4–8.3)
TRICHOMONAS: ABNORMAL
TROPONIN INTERP: NORMAL
TROPONIN INTERP: NORMAL
TROPONIN T: NORMAL NG/ML
TROPONIN T: NORMAL NG/ML
TROPONIN, HIGH SENSITIVITY: 10 NG/L (ref 0–22)
TROPONIN, HIGH SENSITIVITY: 12 NG/L (ref 0–22)
TURBIDITY: CLEAR
URINE HGB: NEGATIVE
UROBILINOGEN, URINE: NORMAL
WBC UA: ABNORMAL /HPF (ref 0–5)
YEAST: ABNORMAL

## 2020-06-05 PROCEDURE — 36415 COLL VENOUS BLD VENIPUNCTURE: CPT

## 2020-06-05 PROCEDURE — 2500000003 HC RX 250 WO HCPCS: Performed by: EMERGENCY MEDICINE

## 2020-06-05 PROCEDURE — 6360000004 HC RX CONTRAST MEDICATION: Performed by: EMERGENCY MEDICINE

## 2020-06-05 PROCEDURE — 84484 ASSAY OF TROPONIN QUANT: CPT

## 2020-06-05 PROCEDURE — 96375 TX/PRO/DX INJ NEW DRUG ADDON: CPT

## 2020-06-05 PROCEDURE — 93010 ELECTROCARDIOGRAM REPORT: CPT | Performed by: INTERNAL MEDICINE

## 2020-06-05 PROCEDURE — 96365 THER/PROPH/DIAG IV INF INIT: CPT

## 2020-06-05 PROCEDURE — 81001 URINALYSIS AUTO W/SCOPE: CPT

## 2020-06-05 PROCEDURE — 71275 CT ANGIOGRAPHY CHEST: CPT

## 2020-06-05 PROCEDURE — 2580000003 HC RX 258: Performed by: EMERGENCY MEDICINE

## 2020-06-05 RX ADMIN — DILTIAZEM HYDROCHLORIDE 10 MG/HR: 5 INJECTION INTRAVENOUS at 00:10

## 2020-06-05 RX ADMIN — IOPAMIDOL 75 ML: 755 INJECTION, SOLUTION INTRAVENOUS at 00:31

## 2020-06-05 RX ADMIN — SODIUM CHLORIDE 500 ML: 9 INJECTION, SOLUTION INTRAVENOUS at 00:09

## 2020-06-05 ASSESSMENT — ENCOUNTER SYMPTOMS
SORE THROAT: 0
SHORTNESS OF BREATH: 0
NAUSEA: 1
ABDOMINAL PAIN: 1
COLOR CHANGE: 0
VOMITING: 1
BACK PAIN: 1
COUGH: 0

## 2020-06-05 NOTE — ED PROVIDER NOTES
[facility] ED  eMERGENCY dEPARTMENT eNCOUnter      Pt Name: Catrachita Marie  MRN: [id number]  [place] 1955  Date of evaluation: 6/4/2020  Provider: Ramandeep [address]       Chief Complaint   Patient presents with    Chest Pain     onset at approx 1830, midsternal chest pressure    Emesis     onset at approx 1830         HISTORY OF PRESENT ILLNESS   (Location/Symptom, Timing/Onset, Context/Setting, Quality, Duration, Modifying Factors, Severity) Note limiting factors. HPI    Catrachita Marie is a 75 y.o. male who presents to the emergency department with complaint of chest pain as well as nausea and vomiting. Patient reports a past medical history of mitral valve surgery. He states he is on Coumadin secondary to this. He also reports he has a known history of atrial fibrillation and states that the [address] access his cardiologist and is planning on a \"ablation\". The patient states that around 6:30 PM this evening he began to feel the symptoms of chest discomfort and nausea. He states that despite taking his normal medications the symptoms persisted and therefore he comes in for evaluation    Nursing Notes were reviewed. REVIEW OF SYSTEMS    (2+ for level 4; 10+ for level 5)   Review of Systems   Constitutional: Negative for chills and fever. HENT: Negative for congestion and sore throat. Eyes: Negative for visual disturbance. Respiratory: Negative for cough and shortness of breath. Cardiovascular: Positive for chest pain and palpitations. Negative for leg swelling. Gastrointestinal: Positive for abdominal pain, nausea and vomiting. Genitourinary: Negative for dysuria. Musculoskeletal: Positive for back pain. Skin: Negative for color change and rash. Neurological: Negative for dizziness, light-headedness and headaches. Hematological: Bruises/bleeds easily. All other systems reviewed and are negative.       PAST MEDICAL HISTORY     Past Medical History: Diagnosis Date    Abnormal echocardiogram 3/12, 9/17/12    at least mild-moderate prosthetic mitral valve stenosis. mean gradient of 9-13 mm mercury. Mild MR. EF 60-65%.  CHF (congestive heart failure) (HCC)     Essential hypertension     Generalized osteoarthritis of multiple sites     GERD (gastroesophageal reflux disease)     H/O mitral valve replacement with tissue graft 2012    Bovine Valve    Mixed hyperlipidemia     Paroxysmal atrial flutter ([address] 10/1/12    Spontaneous resolution. History of A. fib surrounding mitral valve surgery.  Sleep apnea     Ulcerative colitis ([address]        SURGICAL HISTORY       Past Surgical History:   Procedure Laterality Date    CARDIAC SURGERY      HERNIA REPAIR      MITRAL VALVE REPLACEMENT  2012    Bovine    SHOULDER SURGERY Right     right dislocation       CURRENT MEDICATIONS       Previous Medications    ALBUTEROL SULFATE HFA (VENTOLIN HFA) 108 (90 BASE) MCG/ACT INHALER    Inhale 2 puffs into the lungs 4 times daily for 7 days    CALCIUM POLYCARBOPHIL (FIBER-CAPS PO)    Take by mouth    FUROSEMIDE (LASIX) 40 MG TABLET    Take 1 tablet by mouth daily    MAGNESIUM OXIDE (MAG-OX) 400 MG TABLET    Take 420 mg by mouth every other day    OMEPRAZOLE (PRILOSEC) 20 MG CAPSULE    Take 20 mg by mouth daily. POTASSIUM CHLORIDE (MICRO-K) 10 MEQ EXTENDED RELEASE CAPSULE    Take 10 mEq by mouth 2 times daily as needed    PREDNISONE (DELTASONE) 10 MG TABLET    Take 4 tabs QD x3 days, then 3 tabs QD x3 days, then 2 tabs QD x3 days, then 1 tab QD x3 days then stop    SPIRONOLACTONE (ALDACTONE) 25 MG TABLET    Take 25 mg by mouth every other day     SULFASALAZINE (AZULFIDINE) 500 MG TABLET    Take 1,000 mg by mouth 4 times daily.     TIZANIDINE (ZANAFLEX) 4 MG TABLET    Take 1 tablet by mouth 3 times daily as needed (muscle spasm)    VERAPAMIL (VERELAN) 120 MG EXTENDED RELEASE CAPSULE    Take 180 mg by mouth every morning     VITAMIN D 1000 UNITS CAPS    Take by mouth 1. Paroxysmal atrial fibrillation with RVR ([facility]          DISPOSITION/PLAN   DISPOSITION        PATIENT REFERRED TO:  Khurram Lua MD  [place] [address], [address]  [place] [address]  [phone]    Schedule an appointment as soon as possible for a visit in 2 days  For repeat evaluation      DISCHARGE MEDICATIONS:  New Prescriptions    No medications on file          (Please note:  Portions of this note were completed with a voice recognition program.  Efforts were made to edit the dictations but occasionally words and phrases are mis-transcribed.)  Form v2016. J.5-cn    Yumiko Clancy DO (electronically signed)  Emergency Medicine Provider        DO Luna  06/05/20 [id number]

## 2020-06-05 NOTE — ED NOTES
Cardizem 20mg given per Dr. Anastasiia Harry.       Latonia Cali, RN  06/04/20 [address], RN  06/04/20 [id number]

## 2020-12-23 ENCOUNTER — APPOINTMENT (OUTPATIENT)
Dept: CT IMAGING | Age: 65
End: 2020-12-23
Payer: MEDICARE

## 2020-12-23 ENCOUNTER — HOSPITAL ENCOUNTER (EMERGENCY)
Age: 65
Discharge: LEFT BEFORE DISCHARGE | End: 2020-12-23
Attending: EMERGENCY MEDICINE
Payer: MEDICARE

## 2020-12-23 ENCOUNTER — APPOINTMENT (OUTPATIENT)
Dept: GENERAL RADIOLOGY | Age: 65
End: 2020-12-23
Payer: MEDICARE

## 2020-12-23 VITALS
HEART RATE: 96 BPM | SYSTOLIC BLOOD PRESSURE: 148 MMHG | TEMPERATURE: 98 F | RESPIRATION RATE: 22 BRPM | DIASTOLIC BLOOD PRESSURE: 103 MMHG | OXYGEN SATURATION: 93 %

## 2020-12-23 LAB
ABSOLUTE EOS #: 0.11 K/UL (ref 0–0.44)
ABSOLUTE IMMATURE GRANULOCYTE: 0.03 K/UL (ref 0–0.3)
ABSOLUTE LYMPH #: 1.51 K/UL (ref 1.1–3.7)
ABSOLUTE MONO #: 0.86 K/UL (ref 0.1–1.2)
ALBUMIN SERPL-MCNC: 4.2 G/DL (ref 3.5–5.2)
ALBUMIN/GLOBULIN RATIO: 1.2 (ref 1–2.5)
ALP BLD-CCNC: 111 U/L (ref 40–129)
ALT SERPL-CCNC: 22 U/L (ref 5–41)
ANION GAP SERPL CALCULATED.3IONS-SCNC: 14 MMOL/L (ref 9–17)
AST SERPL-CCNC: 24 U/L
BASOPHILS # BLD: 0 % (ref 0–2)
BASOPHILS ABSOLUTE: 0.04 K/UL (ref 0–0.2)
BILIRUB SERPL-MCNC: 0.6 MG/DL (ref 0.3–1.2)
BNP INTERPRETATION: NORMAL
BUN BLDV-MCNC: 23 MG/DL (ref 8–23)
BUN/CREAT BLD: 24 (ref 9–20)
CALCIUM SERPL-MCNC: 9.4 MG/DL (ref 8.6–10.4)
CHLORIDE BLD-SCNC: 98 MMOL/L (ref 98–107)
CO2: 28 MMOL/L (ref 20–31)
CREAT SERPL-MCNC: 0.95 MG/DL (ref 0.7–1.2)
DIFFERENTIAL TYPE: ABNORMAL
EKG ATRIAL RATE: 92 BPM
EKG ATRIAL RATE: 94 BPM
EKG P AXIS: -65 DEGREES
EKG Q-T INTERVAL: 422 MS
EKG Q-T INTERVAL: 440 MS
EKG QRS DURATION: 138 MS
EKG QRS DURATION: 152 MS
EKG QTC CALCULATION (BAZETT): 495 MS
EKG QTC CALCULATION (BAZETT): 514 MS
EKG R AXIS: -129 DEGREES
EKG R AXIS: -140 DEGREES
EKG T AXIS: 16 DEGREES
EKG T AXIS: 21 DEGREES
EKG VENTRICULAR RATE: 82 BPM
EKG VENTRICULAR RATE: 83 BPM
EOSINOPHILS RELATIVE PERCENT: 1 % (ref 1–4)
GFR AFRICAN AMERICAN: >60 ML/MIN
GFR NON-AFRICAN AMERICAN: >60 ML/MIN
GFR SERPL CREATININE-BSD FRML MDRD: ABNORMAL ML/MIN/{1.73_M2}
GFR SERPL CREATININE-BSD FRML MDRD: ABNORMAL ML/MIN/{1.73_M2}
GLUCOSE BLD-MCNC: 124 MG/DL (ref 70–99)
HCT VFR BLD CALC: 49.6 % (ref 40.7–50.3)
HEMOGLOBIN: 15.9 G/DL (ref 13–17)
IMMATURE GRANULOCYTES: 0 %
INR BLD: 2.6
LACTIC ACID, WHOLE BLOOD: NORMAL MMOL/L (ref 0.7–2.1)
LACTIC ACID: 1.7 MMOL/L (ref 0.5–2.2)
LYMPHOCYTES # BLD: 15 % (ref 24–43)
MCH RBC QN AUTO: 30.1 PG (ref 25.2–33.5)
MCHC RBC AUTO-ENTMCNC: 32.1 G/DL (ref 28.4–34.8)
MCV RBC AUTO: 93.9 FL (ref 82.6–102.9)
MONOCYTES # BLD: 9 % (ref 3–12)
NRBC AUTOMATED: 0 PER 100 WBC
PARTIAL THROMBOPLASTIN TIME: 42.7 SEC (ref 23.9–33.8)
PDW BLD-RTO: 14.1 % (ref 11.8–14.4)
PLATELET # BLD: 250 K/UL (ref 138–453)
PLATELET ESTIMATE: ABNORMAL
PMV BLD AUTO: 10.1 FL (ref 8.1–13.5)
POTASSIUM SERPL-SCNC: 3.9 MMOL/L (ref 3.7–5.3)
PRO-BNP: 186 PG/ML
PROTHROMBIN TIME: 27.8 SEC (ref 11.5–14.2)
RBC # BLD: 5.28 M/UL (ref 4.21–5.77)
RBC # BLD: ABNORMAL 10*6/UL
SARS-COV-2, RAPID: NOT DETECTED
SARS-COV-2: NORMAL
SARS-COV-2: NORMAL
SEG NEUTROPHILS: 75 % (ref 36–65)
SEGMENTED NEUTROPHILS ABSOLUTE COUNT: 7.62 K/UL (ref 1.5–8.1)
SODIUM BLD-SCNC: 140 MMOL/L (ref 135–144)
SOURCE: NORMAL
TOTAL PROTEIN: 7.8 G/DL (ref 6.4–8.3)
TROPONIN INTERP: NORMAL
TROPONIN INTERP: NORMAL
TROPONIN T: NORMAL NG/ML
TROPONIN T: NORMAL NG/ML
TROPONIN, HIGH SENSITIVITY: 12 NG/L (ref 0–22)
TROPONIN, HIGH SENSITIVITY: 13 NG/L (ref 0–22)
WBC # BLD: 10.2 K/UL (ref 3.5–11.3)
WBC # BLD: ABNORMAL 10*3/UL

## 2020-12-23 PROCEDURE — 36415 COLL VENOUS BLD VENIPUNCTURE: CPT

## 2020-12-23 PROCEDURE — 93005 ELECTROCARDIOGRAM TRACING: CPT | Performed by: EMERGENCY MEDICINE

## 2020-12-23 PROCEDURE — 85610 PROTHROMBIN TIME: CPT

## 2020-12-23 PROCEDURE — 4500000002 HC ER NO CHARGE

## 2020-12-23 PROCEDURE — 2500000003 HC RX 250 WO HCPCS: Performed by: EMERGENCY MEDICINE

## 2020-12-23 PROCEDURE — 6360000004 HC RX CONTRAST MEDICATION: Performed by: EMERGENCY MEDICINE

## 2020-12-23 PROCEDURE — 6370000000 HC RX 637 (ALT 250 FOR IP): Performed by: EMERGENCY MEDICINE

## 2020-12-23 PROCEDURE — 85730 THROMBOPLASTIN TIME PARTIAL: CPT

## 2020-12-23 PROCEDURE — 96374 THER/PROPH/DIAG INJ IV PUSH: CPT

## 2020-12-23 PROCEDURE — U0003 INFECTIOUS AGENT DETECTION BY NUCLEIC ACID (DNA OR RNA); SEVERE ACUTE RESPIRATORY SYNDROME CORONAVIRUS 2 (SARS-COV-2) (CORONAVIRUS DISEASE [COVID-19]), AMPLIFIED PROBE TECHNIQUE, MAKING USE OF HIGH THROUGHPUT TECHNOLOGIES AS DESCRIBED BY CMS-2020-01-R: HCPCS

## 2020-12-23 PROCEDURE — 93010 ELECTROCARDIOGRAM REPORT: CPT | Performed by: INTERNAL MEDICINE

## 2020-12-23 PROCEDURE — 83605 ASSAY OF LACTIC ACID: CPT

## 2020-12-23 PROCEDURE — 71045 X-RAY EXAM CHEST 1 VIEW: CPT

## 2020-12-23 PROCEDURE — 96375 TX/PRO/DX INJ NEW DRUG ADDON: CPT

## 2020-12-23 PROCEDURE — 99285 EMERGENCY DEPT VISIT HI MDM: CPT

## 2020-12-23 PROCEDURE — 6360000002 HC RX W HCPCS: Performed by: EMERGENCY MEDICINE

## 2020-12-23 PROCEDURE — 84484 ASSAY OF TROPONIN QUANT: CPT

## 2020-12-23 PROCEDURE — 85025 COMPLETE CBC W/AUTO DIFF WBC: CPT

## 2020-12-23 PROCEDURE — 80053 COMPREHEN METABOLIC PANEL: CPT

## 2020-12-23 PROCEDURE — 87040 BLOOD CULTURE FOR BACTERIA: CPT

## 2020-12-23 PROCEDURE — 74174 CTA ABD&PLVS W/CONTRAST: CPT

## 2020-12-23 PROCEDURE — 83880 ASSAY OF NATRIURETIC PEPTIDE: CPT

## 2020-12-23 PROCEDURE — U0002 COVID-19 LAB TEST NON-CDC: HCPCS

## 2020-12-23 RX ORDER — ASPIRIN 81 MG/1
324 TABLET, CHEWABLE ORAL 4 TIMES DAILY PRN
Status: DISCONTINUED | OUTPATIENT
Start: 2020-12-23 | End: 2020-12-24 | Stop reason: HOSPADM

## 2020-12-23 RX ORDER — MORPHINE SULFATE 4 MG/ML
4 INJECTION, SOLUTION INTRAMUSCULAR; INTRAVENOUS ONCE
Status: COMPLETED | OUTPATIENT
Start: 2020-12-23 | End: 2020-12-23

## 2020-12-23 RX ORDER — ONDANSETRON 2 MG/ML
4 INJECTION INTRAMUSCULAR; INTRAVENOUS ONCE
Status: COMPLETED | OUTPATIENT
Start: 2020-12-23 | End: 2020-12-23

## 2020-12-23 RX ORDER — NITROGLYCERIN 0.4 MG/1
0.4 TABLET SUBLINGUAL EVERY 5 MIN PRN
Status: DISCONTINUED | OUTPATIENT
Start: 2020-12-23 | End: 2020-12-24 | Stop reason: HOSPADM

## 2020-12-23 RX ORDER — TIZANIDINE HYDROCHLORIDE 4 MG/1
4 CAPSULE, GELATIN COATED ORAL 3 TIMES DAILY
Qty: 30 CAPSULE | Refills: 0 | Status: SHIPPED | OUTPATIENT
Start: 2020-12-23 | End: 2022-08-10

## 2020-12-23 RX ADMIN — ASPIRIN 81 MG CHEWABLE TABLET 324 MG: 81 TABLET CHEWABLE at 19:29

## 2020-12-23 RX ADMIN — ONDANSETRON 4 MG: 2 INJECTION INTRAMUSCULAR; INTRAVENOUS at 19:11

## 2020-12-23 RX ADMIN — IOPAMIDOL 100 ML: 755 INJECTION, SOLUTION INTRAVENOUS at 20:14

## 2020-12-23 RX ADMIN — NITROGLYCERIN 0.4 MG: 0.4 TABLET SUBLINGUAL at 19:29

## 2020-12-23 RX ADMIN — MORPHINE SULFATE 4 MG: 4 INJECTION, SOLUTION INTRAMUSCULAR; INTRAVENOUS at 19:11

## 2020-12-23 RX ADMIN — FAMOTIDINE 20 MG: 10 INJECTION INTRAVENOUS at 20:52

## 2020-12-23 RX ADMIN — HYDROMORPHONE HYDROCHLORIDE 1 MG: 1 INJECTION, SOLUTION INTRAMUSCULAR; INTRAVENOUS; SUBCUTANEOUS at 20:52

## 2020-12-23 ASSESSMENT — PAIN SCALES - GENERAL
PAINLEVEL_OUTOF10: 7
PAINLEVEL_OUTOF10: 8
PAINLEVEL_OUTOF10: 8
PAINLEVEL_OUTOF10: 9
PAINLEVEL_OUTOF10: 8

## 2020-12-23 ASSESSMENT — PAIN DESCRIPTION - PAIN TYPE
TYPE: ACUTE PAIN

## 2020-12-23 ASSESSMENT — PAIN DESCRIPTION - DESCRIPTORS
DESCRIPTORS: ACHING
DESCRIPTORS: ACHING

## 2020-12-23 ASSESSMENT — PAIN DESCRIPTION - PROGRESSION
CLINICAL_PROGRESSION: NOT CHANGED
CLINICAL_PROGRESSION: NOT CHANGED

## 2020-12-23 ASSESSMENT — PAIN DESCRIPTION - LOCATION
LOCATION: BACK;CHEST
LOCATION: CHEST
LOCATION: BACK;CHEST

## 2020-12-23 ASSESSMENT — PAIN DESCRIPTION - ORIENTATION: ORIENTATION: LEFT

## 2020-12-23 ASSESSMENT — PAIN DESCRIPTION - FREQUENCY: FREQUENCY: CONTINUOUS

## 2020-12-24 NOTE — ED PROVIDER NOTES
[facility] Emergency Department ED  EMERGENCY DEPARTMENT ENCOUNTER      Pt Name: Roque Hernández  MRN: [id number]  [place] 1955  Date of evaluation: 12/23/2020  Provider: Jing Hand MD    [address] [place] [address]       Chief Complaint   Patient presents with    Chest Pain     left    Shortness of Breath     87% RA         HISTORY OF PRESENT ILLNESS   (Location/Symptom, Timing/Onset, Context/Setting, Quality, Duration, Modifying Factors, Severity)  Note limiting factors. Roque Hernández is a 75 y.o. male who presents to the emergency department      40-year-old male presented emergency department for evaluation of substernal chest pain. Pain started approximately 45 minutes prior to arrival.  Pain is described as a pressure that radiates towards his back as well as his neck. He is nauseous. Did not have any vomiting. Denies any diaphoresis. Denies any recent illnesses. No cough. No URI symptoms. No known sick contact some shortness of breath symptoms symptoms began. Denies any headache. No focal neurological deficits. No abdominal pain. No low back pain. No lower extremity pain or swelling. Patient does have a history of mitral valve replacement and currently takes Coumadin. He also has a history of atrial fibrillation and had an ablation done this summer. Denies any known history of coronary artery disease. He has not had stents or bypass surgery. Patient not taking anything for pain relief prior to arrival.          Nursing Notes were reviewed. REVIEW OF SYSTEMS    (2-9 systems for level 4, 10 or more for level 5)     Review of Systems   All other systems reviewed and are negative. Except as noted above the remainder of the review of systems was reviewed and negative. PAST MEDICAL HISTORY     Past Medical History:   Diagnosis Date    Abnormal echocardiogram 3/12, 9/17/12    at least mild-moderate prosthetic mitral valve stenosis. mean gradient of 9-13 mm mercury. Mild MR. EF 60-65%.     CHF (congestive heart failure) (HCC)     Essential hypertension     Generalized osteoarthritis of multiple sites     GERD (gastroesophageal reflux disease)     H/O mitral valve replacement with tissue graft 2012    Bovine Valve    Mixed hyperlipidemia     Paroxysmal atrial flutter ([facility] 10/1/12    Spontaneous resolution. History of A. fib surrounding mitral valve surgery.  Sleep apnea     Ulcerative colitis ([facility] [address]          SURGICAL HISTORY       Past Surgical History:   Procedure Laterality Date    CARDIAC SURGERY      HERNIA REPAIR      MITRAL VALVE REPLACEMENT  2012    Bovine    SHOULDER SURGERY Right     right dislocation         CURRENT MEDICATIONS       Previous Medications    ALBUTEROL SULFATE HFA (VENTOLIN HFA) 108 (90 BASE) MCG/ACT INHALER    Inhale 2 puffs into the lungs 4 times daily for 7 days    CALCIUM POLYCARBOPHIL (FIBER-CAPS PO)    Take by mouth    FUROSEMIDE (LASIX) 40 MG TABLET    Take 1 tablet by mouth daily    MAGNESIUM OXIDE (MAG-OX) 400 MG TABLET    Take 420 mg by mouth every other day    METOPROLOL TARTRATE (LOPRESSOR) 50 MG TABLET    Take 1 tablet by mouth 2 times daily    OMEPRAZOLE (PRILOSEC) 20 MG CAPSULE    Take 20 mg by mouth daily. POTASSIUM CHLORIDE (MICRO-K) 10 MEQ EXTENDED RELEASE CAPSULE    Take 10 mEq by mouth 2 times daily as needed    SPIRONOLACTONE (ALDACTONE) 25 MG TABLET    Take 25 mg by mouth every other day     SULFASALAZINE (AZULFIDINE) 500 MG TABLET    Take 1,000 mg by mouth 4 times daily. TIZANIDINE (ZANAFLEX) 4 MG TABLET    Take 1 tablet by mouth 3 times daily as needed (muscle spasm)    VERAPAMIL (VERELAN) 120 MG EXTENDED RELEASE CAPSULE    Take 180 mg by mouth every morning     VITAMIN D 1000 UNITS CAPS    Take by mouth    WARFARIN (COUMADIN) 5 MG TABLET    5 mg 5 days weekly and 7,5 mg 2 days weekly.        ALLERGIES     Statins support therapy, Niacin and related, and Zetia [ezetimibe]    FAMILY HISTORY       Family History   Problem Relation Age of Onset    Cancer Mother     Diabetes Father           SOCIAL HISTORY       Social History     Socioeconomic History    Marital status:      Spouse name: None    Number of children: None    Years of education: None    Highest education level: None   Occupational History    None   Social Needs    Financial resource strain: Not hard at all   Pullman-Shira insecurity     Worry: Never true     Inability: Never true    Transportation needs     Medical: No     Non-medical: No   Tobacco Use    Smoking status: Never Smoker    Smokeless tobacco: Never Used   Substance and Sexual Activity    Alcohol use: Yes     Frequency: 2-3 times a week     Drinks per session: 1 or 2     Binge frequency: Less than monthly     Comment: ocassional social drinker    Drug use: No    Sexual activity: Yes     Partners: Female   Lifestyle    Physical activity     Days per week: 3 days     Minutes per session: 20 min    Stress: Not at all   Relationships    Social connections     Talks on phone: Once a week     Gets together: Once a week     Attends Mandaen service: More than 4 times per year     Active member of club or organization: Yes     Attends meetings of clubs or organizations: More than 4 times per year     Relationship status:     Intimate partner violence     Fear of current or ex partner: No     Emotionally abused: No     Physically abused: No     Forced sexual activity: No   Other Topics Concern    None   Social History Narrative    None       SCREENINGS                        PHYSICAL EXAM    (up to 7 for level 4, 8 or more for level 5)     ED Triage Vitals [12/23/20 1851]   BP Temp Temp src Pulse Resp SpO2 Height Weight   (!) 146/90 98 °F (36.7 °C) -- 84 22 (!) 85 % -- --       Physical Exam  Vitals signs and nursing note reviewed. Constitutional:       Comments: Afebrile. Patient does appear comfortable due to chest pain. He is not in any acute distress   HENT:      Head: Normocephalic and atraumatic. Neck:      Musculoskeletal: Normal range of motion and neck supple. Cardiovascular:      Rate and Rhythm: Normal rate. Comments: Regular rhythm. No apparent P waves noted on EKG. Pulmonary:      Effort: Pulmonary effort is normal.      Comments: Diminished breath sounds bilaterally. No wheezing rales or rhonchi  Abdominal:      Palpations: Abdomen is soft. Comments: Wheeze. No localized tenderness. No peritoneal signs   Skin:     General: Skin is warm and dry. Findings: No rash. Neurological:      General: No focal deficit present. Mental Status: He is alert and oriented to person, place, and time. DIAGNOSTIC RESULTS     EKG: All EKG's are interpreted by the Emergency Department Physician who either signs or Co-signs this chart in the absence of a cardiologist.        RADIOLOGY:   Non-plain film images such as CT, Ultrasound and MRI are read by the radiologist. Plain radiographic images are visualized and preliminarily interpreted by the emergency physician with the below findings:        Interpretation per the Radiologist below, if available at the time of this note:    XR CHEST PORTABLE   Final Result   1. Patchy bilateral airspace opacities nonspecific with multifocal pneumonia   not excluded. 2. Bilateral calcified pleural plaques compatible with asbestos related   pleural disease.          CTA CHEST ABDOMEN PELVIS W CONTRAST    (Results Pending)         ED BEDSIDE ULTRASOUND:   Performed by ED Physician - none    LABS:  Labs Reviewed   CBC WITH AUTO DIFFERENTIAL - Abnormal; Notable for the following components:       Result Value    Seg Neutrophils 75 (*)     Lymphocytes 15 (*)     All other components within normal limits   CULTURE, BLOOD 1   CULTURE, BLOOD 1   COMPREHENSIVE METABOLIC PANEL   TROPONIN   BRAIN NATRIURETIC PEPTIDE   COVID-19   APTT   PROTIME-INR   COVID-19   LACTIC ACID, PLASMA       All other labs were within normal range or not returned as of this patient however stated he did not wish to stay in the hospital.  Have a lengthy discussion with him regarding my concerns especially with his initial appearance and discomfort and the possibility of cardiac or other significant cause of his symptoms. I did offer to contact his primary care physician to see if he may be willing to admit for observation locally but the patient stated he was going to go home and would not stay in the hospital.  Lengthy discussion with the patient and his wife regarding risks of leaving [address] The patient was willing to sign out [address] He was instructed to return daily or seek medical attention immediately for any worsening of his symptoms or any other acute concerns. REASSESSMENT          CRITICAL CARE TIME   Total Critical Care time was  minutes, excluding separately reportable procedures. There was a high probability of clinically significant/life threatening deterioration in the patient's condition which required my urgent intervention. CONSULTS:  None    PROCEDURES:  Unless otherwise noted below, none     Procedures        FINAL IMPRESSION    No diagnosis found. DISPOSITION/PLAN   DISPOSITION        PATIENT REFERRED TO:  No follow-up provider specified. DISCHARGE MEDICATIONS:  New Prescriptions    No medications on file     Controlled Substances Monitoring:     No flowsheet data found.     (Please note that portions of this note were completed with a voice recognition program.  Efforts were made to edit the dictations but occasionally words are mis-transcribed.)    Rich Wakefield MD (electronically signed)  Attending Emergency Physician             Rich Wakefield MD  12/24/20 Scott Christensen MD  12/24/20 [id number]

## 2020-12-28 LAB
CULTURE: NORMAL
Lab: NORMAL
SPECIMEN DESCRIPTION: NORMAL

## 2021-09-27 PROBLEM — K44.9 PARAESOPHAGEAL HERNIA: Status: ACTIVE | Noted: 2021-09-27

## 2022-08-10 ENCOUNTER — HOSPITAL ENCOUNTER (OUTPATIENT)
Age: 67
Discharge: HOME | End: 2022-08-10
Payer: MEDICARE

## 2022-08-10 ENCOUNTER — HOSPITAL ENCOUNTER (OUTPATIENT)
Dept: GENERAL RADIOLOGY | Age: 67
Discharge: HOME | End: 2022-08-12
Payer: MEDICARE

## 2022-08-10 DIAGNOSIS — M54.41 CHRONIC MIDLINE LOW BACK PAIN WITH RIGHT-SIDED SCIATICA: ICD-10-CM

## 2022-08-10 DIAGNOSIS — G89.29 CHRONIC MIDLINE LOW BACK PAIN WITH RIGHT-SIDED SCIATICA: ICD-10-CM

## 2022-08-10 PROCEDURE — 72100 X-RAY EXAM L-S SPINE 2/3 VWS: CPT

## 2022-12-03 ENCOUNTER — ANESTHESIA (OUTPATIENT)
Dept: OPERATING ROOM | Age: 67
End: 2022-12-03
Payer: MEDICARE

## 2022-12-03 ENCOUNTER — APPOINTMENT (OUTPATIENT)
Dept: GENERAL RADIOLOGY | Age: 67
End: 2022-12-03
Payer: OTHER GOVERNMENT

## 2022-12-03 ENCOUNTER — APPOINTMENT (OUTPATIENT)
Dept: CT IMAGING | Age: 67
End: 2022-12-03
Payer: OTHER GOVERNMENT

## 2022-12-03 ENCOUNTER — HOSPITAL ENCOUNTER (INPATIENT)
Age: 67
LOS: 5 days | Discharge: HOME | End: 2022-12-08
Attending: EMERGENCY MEDICINE | Admitting: INTERNAL MEDICINE
Payer: OTHER GOVERNMENT

## 2022-12-03 ENCOUNTER — ANESTHESIA EVENT (OUTPATIENT)
Dept: OPERATING ROOM | Age: 67
End: 2022-12-03
Payer: MEDICARE

## 2022-12-03 DIAGNOSIS — K56.600 PARTIAL INTESTINAL OBSTRUCTION, UNSPECIFIED CAUSE (HCC): ICD-10-CM

## 2022-12-03 DIAGNOSIS — K56.609 SMALL BOWEL OBSTRUCTION (HCC): Primary | ICD-10-CM

## 2022-12-03 LAB
ABO/RH: NORMAL
ABSOLUTE EOS #: 0.05 K/UL (ref 0–0.44)
ABSOLUTE IMMATURE GRANULOCYTE: 0.03 K/UL (ref 0–0.3)
ABSOLUTE LYMPH #: 0.83 K/UL (ref 1.1–3.7)
ABSOLUTE MONO #: 0.72 K/UL (ref 0.1–1.2)
ALBUMIN SERPL-MCNC: 4.8 G/DL (ref 3.5–5.2)
ALBUMIN/GLOBULIN RATIO: 1.3 (ref 1–2.5)
ALP BLD-CCNC: 143 U/L (ref 40–129)
ALT SERPL-CCNC: 26 U/L (ref 5–41)
AMORPHOUS: ABNORMAL
ANION GAP SERPL CALCULATED.3IONS-SCNC: 13 MMOL/L (ref 9–17)
ANTIBODY SCREEN: NEGATIVE
ARM BAND NUMBER: NORMAL
AST SERPL-CCNC: 29 U/L
BACTERIA: ABNORMAL
BASOPHILS # BLD: 0 % (ref 0–2)
BASOPHILS ABSOLUTE: <0.03 K/UL (ref 0–0.2)
BILIRUB SERPL-MCNC: 1.1 MG/DL (ref 0.3–1.2)
BILIRUBIN URINE: ABNORMAL
BUN BLDV-MCNC: 23 MG/DL (ref 8–23)
BUN/CREAT BLD: 18 (ref 9–20)
CALCIUM SERPL-MCNC: 10.4 MG/DL (ref 8.6–10.4)
CHLORIDE BLD-SCNC: 91 MMOL/L (ref 98–107)
CO2: 34 MMOL/L (ref 20–31)
COLOR: YELLOW
CREAT SERPL-MCNC: 1.29 MG/DL (ref 0.7–1.2)
EOSINOPHILS RELATIVE PERCENT: 1 % (ref 1–4)
EPITHELIAL CELLS UA: ABNORMAL /HPF (ref 0–5)
EXPIRATION DATE: NORMAL
GFR SERPL CREATININE-BSD FRML MDRD: >60 ML/MIN/1.73M2
GLUCOSE BLD-MCNC: 140 MG/DL (ref 70–99)
GLUCOSE URINE: NEGATIVE
HCT VFR BLD CALC: 49.5 % (ref 40.7–50.3)
HEMOGLOBIN: 16.7 G/DL (ref 13–17)
IMMATURE GRANULOCYTES: 0 %
INR BLD: 1.8
KETONES, URINE: ABNORMAL
LACTIC ACID: 1.6 MMOL/L (ref 0.5–2.2)
LEUKOCYTE ESTERASE, URINE: NEGATIVE
LIPASE: 40 U/L (ref 13–60)
LYMPHOCYTES # BLD: 8 % (ref 24–43)
MCH RBC QN AUTO: 31.2 PG (ref 25.2–33.5)
MCHC RBC AUTO-ENTMCNC: 33.7 G/DL (ref 28.4–34.8)
MCV RBC AUTO: 92.4 FL (ref 82.6–102.9)
MONOCYTES # BLD: 7 % (ref 3–12)
MUCUS: ABNORMAL
NITRITE, URINE: NEGATIVE
NRBC AUTOMATED: 0 PER 100 WBC
PDW BLD-RTO: 13.4 % (ref 11.8–14.4)
PH UA: 5.5 (ref 5–9)
PLATELET # BLD: 255 K/UL (ref 138–453)
PMV BLD AUTO: 10.2 FL (ref 8.1–13.5)
POTASSIUM SERPL-SCNC: 4.3 MMOL/L (ref 3.7–5.3)
PROTEIN UA: ABNORMAL
PROTHROMBIN TIME: 20.9 SEC (ref 11.5–14.2)
RBC # BLD: 5.36 M/UL (ref 4.21–5.77)
RBC UA: ABNORMAL /HPF (ref 0–2)
SEG NEUTROPHILS: 84 % (ref 36–65)
SEGMENTED NEUTROPHILS ABSOLUTE COUNT: 8.4 K/UL (ref 1.5–8.1)
SODIUM BLD-SCNC: 138 MMOL/L (ref 135–144)
SPECIFIC GRAVITY UA: >1.03 (ref 1.01–1.02)
TOTAL PROTEIN: 8.4 G/DL (ref 6.4–8.3)
TURBIDITY: CLEAR
URINE HGB: NEGATIVE
UROBILINOGEN, URINE: NORMAL
WBC # BLD: 10.1 K/UL (ref 3.5–11.3)
WBC UA: ABNORMAL /HPF (ref 0–5)

## 2022-12-03 PROCEDURE — 0DB80ZZ EXCISION OF SMALL INTESTINE, OPEN APPROACH: ICD-10-PCS | Performed by: SPECIALIST

## 2022-12-03 PROCEDURE — 83605 ASSAY OF LACTIC ACID: CPT

## 2022-12-03 PROCEDURE — 6360000002 HC RX W HCPCS: Performed by: INTERNAL MEDICINE

## 2022-12-03 PROCEDURE — 2580000003 HC RX 258: Performed by: SPECIALIST

## 2022-12-03 PROCEDURE — 1200000000 HC SEMI PRIVATE

## 2022-12-03 PROCEDURE — 99285 EMERGENCY DEPT VISIT HI MDM: CPT

## 2022-12-03 PROCEDURE — 86900 BLOOD TYPING SEROLOGIC ABO: CPT

## 2022-12-03 PROCEDURE — 51702 INSERT TEMP BLADDER CATH: CPT

## 2022-12-03 PROCEDURE — 6360000002 HC RX W HCPCS: Performed by: SPECIALIST

## 2022-12-03 PROCEDURE — 2720000010 HC SURG SUPPLY STERILE: Performed by: SPECIALIST

## 2022-12-03 PROCEDURE — 3700000001 HC ADD 15 MINUTES (ANESTHESIA): Performed by: SPECIALIST

## 2022-12-03 PROCEDURE — 36415 COLL VENOUS BLD VENIPUNCTURE: CPT

## 2022-12-03 PROCEDURE — 83690 ASSAY OF LIPASE: CPT

## 2022-12-03 PROCEDURE — 7100000001 HC PACU RECOVERY - ADDTL 15 MIN: Performed by: SPECIALIST

## 2022-12-03 PROCEDURE — 85610 PROTHROMBIN TIME: CPT

## 2022-12-03 PROCEDURE — P9017 PLASMA 1 DONOR FRZ W/IN 8 HR: HCPCS

## 2022-12-03 PROCEDURE — 2580000003 HC RX 258

## 2022-12-03 PROCEDURE — 3600000004 HC SURGERY LEVEL 4 BASE: Performed by: SPECIALIST

## 2022-12-03 PROCEDURE — 94761 N-INVAS EAR/PLS OXIMETRY MLT: CPT

## 2022-12-03 PROCEDURE — 2700000000 HC OXYGEN THERAPY PER DAY

## 2022-12-03 PROCEDURE — 86901 BLOOD TYPING SEROLOGIC RH(D): CPT

## 2022-12-03 PROCEDURE — A4216 STERILE WATER/SALINE, 10 ML: HCPCS

## 2022-12-03 PROCEDURE — 6360000004 HC RX CONTRAST MEDICATION: Performed by: EMERGENCY MEDICINE

## 2022-12-03 PROCEDURE — 99221 1ST HOSP IP/OBS SF/LOW 40: CPT | Performed by: SPECIALIST

## 2022-12-03 PROCEDURE — 2500000003 HC RX 250 WO HCPCS: Performed by: SPECIALIST

## 2022-12-03 PROCEDURE — 88307 TISSUE EXAM BY PATHOLOGIST: CPT

## 2022-12-03 PROCEDURE — 64488 TAP BLOCK BI INJECTION: CPT

## 2022-12-03 PROCEDURE — 80053 COMPREHEN METABOLIC PANEL: CPT

## 2022-12-03 PROCEDURE — 3700000000 HC ANESTHESIA ATTENDED CARE: Performed by: SPECIALIST

## 2022-12-03 PROCEDURE — 86850 RBC ANTIBODY SCREEN: CPT

## 2022-12-03 PROCEDURE — 0YU50JZ SUPPLEMENT RIGHT INGUINAL REGION WITH SYNTHETIC SUBSTITUTE, OPEN APPROACH: ICD-10-PCS | Performed by: SPECIALIST

## 2022-12-03 PROCEDURE — 93005 ELECTROCARDIOGRAM TRACING: CPT

## 2022-12-03 PROCEDURE — 74177 CT ABD & PELVIS W/CONTRAST: CPT

## 2022-12-03 PROCEDURE — 74018 RADEX ABDOMEN 1 VIEW: CPT

## 2022-12-03 PROCEDURE — 2500000003 HC RX 250 WO HCPCS

## 2022-12-03 PROCEDURE — 3600000014 HC SURGERY LEVEL 4 ADDTL 15MIN: Performed by: SPECIALIST

## 2022-12-03 PROCEDURE — 7100000000 HC PACU RECOVERY - FIRST 15 MIN: Performed by: SPECIALIST

## 2022-12-03 PROCEDURE — 2709999900 HC NON-CHARGEABLE SUPPLY: Performed by: SPECIALIST

## 2022-12-03 PROCEDURE — 2580000003 HC RX 258: Performed by: INTERNAL MEDICINE

## 2022-12-03 PROCEDURE — 85025 COMPLETE CBC W/AUTO DIFF WBC: CPT

## 2022-12-03 PROCEDURE — 81001 URINALYSIS AUTO W/SCOPE: CPT

## 2022-12-03 PROCEDURE — C1781 MESH (IMPLANTABLE): HCPCS | Performed by: SPECIALIST

## 2022-12-03 PROCEDURE — 86927 PLASMA FRESH FROZEN: CPT

## 2022-12-03 PROCEDURE — 6360000002 HC RX W HCPCS

## 2022-12-03 DEVICE — MESH HERN W7.5XL15CM INGUINAL POLYPR SYN FLAT L PORE MFIL: Type: IMPLANTABLE DEVICE | Site: ABDOMEN | Status: FUNCTIONAL

## 2022-12-03 RX ORDER — DEXTROSE, SODIUM CHLORIDE, AND POTASSIUM CHLORIDE 5; .45; .15 G/100ML; G/100ML; G/100ML
INJECTION INTRAVENOUS CONTINUOUS
Status: DISCONTINUED | OUTPATIENT
Start: 2022-12-03 | End: 2022-12-07

## 2022-12-03 RX ORDER — SODIUM CHLORIDE 0.9 % (FLUSH) 0.9 %
5-40 SYRINGE (ML) INJECTION EVERY 12 HOURS SCHEDULED
Status: DISCONTINUED | OUTPATIENT
Start: 2022-12-03 | End: 2022-12-08 | Stop reason: HOSPADM

## 2022-12-03 RX ORDER — METOCLOPRAMIDE HYDROCHLORIDE 5 MG/ML
10 INJECTION INTRAMUSCULAR; INTRAVENOUS
Status: DISCONTINUED | OUTPATIENT
Start: 2022-12-03 | End: 2022-12-03 | Stop reason: HOSPADM

## 2022-12-03 RX ORDER — SULFASALAZINE 500 MG/1
1000 TABLET ORAL 4 TIMES DAILY
Status: DISCONTINUED | OUTPATIENT
Start: 2022-12-03 | End: 2022-12-08 | Stop reason: HOSPADM

## 2022-12-03 RX ORDER — ONDANSETRON 4 MG/1
4 TABLET, ORALLY DISINTEGRATING ORAL EVERY 8 HOURS PRN
Status: DISCONTINUED | OUTPATIENT
Start: 2022-12-03 | End: 2022-12-03

## 2022-12-03 RX ORDER — LANOLIN ALCOHOL/MO/W.PET/CERES
400 CREAM (GRAM) TOPICAL EVERY OTHER DAY
Status: DISCONTINUED | OUTPATIENT
Start: 2022-12-03 | End: 2022-12-08 | Stop reason: HOSPADM

## 2022-12-03 RX ORDER — ACETAMINOPHEN 325 MG/1
650 TABLET ORAL EVERY 6 HOURS PRN
Status: DISCONTINUED | OUTPATIENT
Start: 2022-12-03 | End: 2022-12-08 | Stop reason: HOSPADM

## 2022-12-03 RX ORDER — SUCCINYLCHOLINE CHLORIDE 20 MG/ML
INJECTION INTRAMUSCULAR; INTRAVENOUS PRN
Status: DISCONTINUED | OUTPATIENT
Start: 2022-12-03 | End: 2022-12-03 | Stop reason: SDUPTHER

## 2022-12-03 RX ORDER — ONDANSETRON 2 MG/ML
INJECTION INTRAMUSCULAR; INTRAVENOUS PRN
Status: DISCONTINUED | OUTPATIENT
Start: 2022-12-03 | End: 2022-12-03 | Stop reason: SDUPTHER

## 2022-12-03 RX ORDER — SODIUM CHLORIDE 9 MG/ML
INJECTION, SOLUTION INTRAVENOUS CONTINUOUS
Status: DISCONTINUED | OUTPATIENT
Start: 2022-12-03 | End: 2022-12-03

## 2022-12-03 RX ORDER — LABETALOL HYDROCHLORIDE 5 MG/ML
10 INJECTION, SOLUTION INTRAVENOUS
Status: DISCONTINUED | OUTPATIENT
Start: 2022-12-03 | End: 2022-12-03 | Stop reason: HOSPADM

## 2022-12-03 RX ORDER — ACETAMINOPHEN 650 MG/1
650 SUPPOSITORY RECTAL EVERY 6 HOURS PRN
Status: DISCONTINUED | OUTPATIENT
Start: 2022-12-03 | End: 2022-12-08 | Stop reason: HOSPADM

## 2022-12-03 RX ORDER — ONDANSETRON 4 MG/1
4 TABLET, ORALLY DISINTEGRATING ORAL EVERY 4 HOURS PRN
Status: DISCONTINUED | OUTPATIENT
Start: 2022-12-03 | End: 2022-12-08 | Stop reason: HOSPADM

## 2022-12-03 RX ORDER — MORPHINE SULFATE 4 MG/ML
4 INJECTION, SOLUTION INTRAMUSCULAR; INTRAVENOUS
Status: DISCONTINUED | OUTPATIENT
Start: 2022-12-03 | End: 2022-12-08 | Stop reason: HOSPADM

## 2022-12-03 RX ORDER — VITAMIN B COMPLEX
1 TABLET ORAL DAILY
Status: DISCONTINUED | OUTPATIENT
Start: 2022-12-03 | End: 2022-12-08 | Stop reason: HOSPADM

## 2022-12-03 RX ORDER — FENTANYL CITRATE 50 UG/ML
50 INJECTION, SOLUTION INTRAMUSCULAR; INTRAVENOUS EVERY 5 MIN PRN
Status: DISCONTINUED | OUTPATIENT
Start: 2022-12-03 | End: 2022-12-03 | Stop reason: HOSPADM

## 2022-12-03 RX ORDER — PIPERACILLIN SODIUM, TAZOBACTAM SODIUM 3; .375 G/15ML; G/15ML
3375 INJECTION, POWDER, LYOPHILIZED, FOR SOLUTION INTRAVENOUS EVERY 6 HOURS
Status: DISCONTINUED | OUTPATIENT
Start: 2022-12-03 | End: 2022-12-03

## 2022-12-03 RX ORDER — LABETALOL HYDROCHLORIDE 5 MG/ML
INJECTION, SOLUTION INTRAVENOUS PRN
Status: DISCONTINUED | OUTPATIENT
Start: 2022-12-03 | End: 2022-12-03 | Stop reason: SDUPTHER

## 2022-12-03 RX ORDER — PROPOFOL 10 MG/ML
INJECTION, EMULSION INTRAVENOUS PRN
Status: DISCONTINUED | OUTPATIENT
Start: 2022-12-03 | End: 2022-12-03 | Stop reason: SDUPTHER

## 2022-12-03 RX ORDER — GENTAMICIN SULFATE 40 MG/ML
INJECTION, SOLUTION INTRAMUSCULAR; INTRAVENOUS
Status: DISPENSED
Start: 2022-12-03 | End: 2022-12-04

## 2022-12-03 RX ORDER — FENTANYL CITRATE 50 UG/ML
INJECTION, SOLUTION INTRAMUSCULAR; INTRAVENOUS PRN
Status: DISCONTINUED | OUTPATIENT
Start: 2022-12-03 | End: 2022-12-03 | Stop reason: SDUPTHER

## 2022-12-03 RX ORDER — SODIUM CHLORIDE 0.9 % (FLUSH) 0.9 %
5-40 SYRINGE (ML) INJECTION PRN
Status: DISCONTINUED | OUTPATIENT
Start: 2022-12-03 | End: 2022-12-03 | Stop reason: HOSPADM

## 2022-12-03 RX ORDER — FENTANYL CITRATE 50 UG/ML
25 INJECTION, SOLUTION INTRAMUSCULAR; INTRAVENOUS EVERY 5 MIN PRN
Status: DISCONTINUED | OUTPATIENT
Start: 2022-12-03 | End: 2022-12-03 | Stop reason: HOSPADM

## 2022-12-03 RX ORDER — SODIUM CHLORIDE 9 MG/ML
INJECTION, SOLUTION INTRAVENOUS CONTINUOUS PRN
Status: DISCONTINUED | OUTPATIENT
Start: 2022-12-03 | End: 2022-12-03 | Stop reason: SDUPTHER

## 2022-12-03 RX ORDER — SODIUM CHLORIDE 9 MG/ML
INJECTION, SOLUTION INTRAVENOUS PRN
Status: DISCONTINUED | OUTPATIENT
Start: 2022-12-03 | End: 2022-12-08 | Stop reason: HOSPADM

## 2022-12-03 RX ORDER — GENTAMICIN SULFATE 40 MG/ML
160 INJECTION, SOLUTION INTRAMUSCULAR; INTRAVENOUS ONCE
Status: DISCONTINUED | OUTPATIENT
Start: 2022-12-03 | End: 2022-12-03

## 2022-12-03 RX ORDER — SODIUM CHLORIDE 0.9 % (FLUSH) 0.9 %
10 SYRINGE (ML) INJECTION PRN
Status: DISCONTINUED | OUTPATIENT
Start: 2022-12-03 | End: 2022-12-08 | Stop reason: HOSPADM

## 2022-12-03 RX ORDER — SODIUM CHLORIDE 9 MG/ML
INJECTION, SOLUTION INTRAVENOUS PRN
Status: DISCONTINUED | OUTPATIENT
Start: 2022-12-03 | End: 2022-12-03 | Stop reason: HOSPADM

## 2022-12-03 RX ORDER — LIDOCAINE HYDROCHLORIDE 20 MG/ML
INJECTION, SOLUTION EPIDURAL; INFILTRATION; INTRACAUDAL; PERINEURAL PRN
Status: DISCONTINUED | OUTPATIENT
Start: 2022-12-03 | End: 2022-12-03 | Stop reason: SDUPTHER

## 2022-12-03 RX ORDER — ONDANSETRON 2 MG/ML
4 INJECTION INTRAMUSCULAR; INTRAVENOUS EVERY 6 HOURS PRN
Status: DISCONTINUED | OUTPATIENT
Start: 2022-12-03 | End: 2022-12-03

## 2022-12-03 RX ORDER — ONDANSETRON 2 MG/ML
4 INJECTION INTRAMUSCULAR; INTRAVENOUS
Status: DISCONTINUED | OUTPATIENT
Start: 2022-12-03 | End: 2022-12-03 | Stop reason: HOSPADM

## 2022-12-03 RX ORDER — WARFARIN SODIUM 5 MG/1
5 TABLET ORAL
Status: DISCONTINUED | OUTPATIENT
Start: 2022-12-03 | End: 2022-12-04 | Stop reason: ALTCHOICE

## 2022-12-03 RX ORDER — MORPHINE SULFATE 2 MG/ML
2 INJECTION, SOLUTION INTRAMUSCULAR; INTRAVENOUS
Status: DISCONTINUED | OUTPATIENT
Start: 2022-12-03 | End: 2022-12-08 | Stop reason: HOSPADM

## 2022-12-03 RX ORDER — SODIUM CHLORIDE 9 MG/ML
INJECTION INTRAVENOUS PRN
Status: DISCONTINUED | OUTPATIENT
Start: 2022-12-03 | End: 2022-12-03 | Stop reason: SDUPTHER

## 2022-12-03 RX ORDER — SODIUM CHLORIDE, SODIUM LACTATE, POTASSIUM CHLORIDE, AND CALCIUM CHLORIDE .6; .31; .03; .02 G/100ML; G/100ML; G/100ML; G/100ML
1000 INJECTION, SOLUTION INTRAVENOUS ONCE
Status: COMPLETED | OUTPATIENT
Start: 2022-12-03 | End: 2022-12-03

## 2022-12-03 RX ORDER — ROCURONIUM BROMIDE 10 MG/ML
INJECTION, SOLUTION INTRAVENOUS PRN
Status: DISCONTINUED | OUTPATIENT
Start: 2022-12-03 | End: 2022-12-03 | Stop reason: SDUPTHER

## 2022-12-03 RX ORDER — SODIUM CHLORIDE, SODIUM LACTATE, POTASSIUM CHLORIDE, CALCIUM CHLORIDE 600; 310; 30; 20 MG/100ML; MG/100ML; MG/100ML; MG/100ML
INJECTION, SOLUTION INTRAVENOUS CONTINUOUS PRN
Status: DISCONTINUED | OUTPATIENT
Start: 2022-12-03 | End: 2022-12-03 | Stop reason: SDUPTHER

## 2022-12-03 RX ORDER — POLYETHYLENE GLYCOL 3350 17 G/17G
17 POWDER, FOR SOLUTION ORAL DAILY PRN
Status: DISCONTINUED | OUTPATIENT
Start: 2022-12-03 | End: 2022-12-08 | Stop reason: HOSPADM

## 2022-12-03 RX ORDER — ENOXAPARIN SODIUM 100 MG/ML
40 INJECTION SUBCUTANEOUS DAILY
Status: COMPLETED | OUTPATIENT
Start: 2022-12-03 | End: 2022-12-04

## 2022-12-03 RX ORDER — WARFARIN SODIUM 7.5 MG/1
7.5 TABLET ORAL
Status: DISCONTINUED | OUTPATIENT
Start: 2022-12-05 | End: 2022-12-04 | Stop reason: ALTCHOICE

## 2022-12-03 RX ORDER — DEXAMETHASONE SODIUM PHOSPHATE 10 MG/ML
INJECTION, SOLUTION INTRAMUSCULAR; INTRAVENOUS PRN
Status: DISCONTINUED | OUTPATIENT
Start: 2022-12-03 | End: 2022-12-03 | Stop reason: SDUPTHER

## 2022-12-03 RX ORDER — DEXAMETHASONE SODIUM PHOSPHATE 4 MG/ML
INJECTION, SOLUTION INTRA-ARTICULAR; INTRALESIONAL; INTRAMUSCULAR; INTRAVENOUS; SOFT TISSUE PRN
Status: DISCONTINUED | OUTPATIENT
Start: 2022-12-03 | End: 2022-12-03 | Stop reason: SDUPTHER

## 2022-12-03 RX ORDER — ROPIVACAINE HYDROCHLORIDE 5 MG/ML
INJECTION, SOLUTION EPIDURAL; INFILTRATION; PERINEURAL PRN
Status: DISCONTINUED | OUTPATIENT
Start: 2022-12-03 | End: 2022-12-03 | Stop reason: SDUPTHER

## 2022-12-03 RX ORDER — PHENYLEPHRINE HYDROCHLORIDE 10 MG/ML
INJECTION INTRAVENOUS PRN
Status: DISCONTINUED | OUTPATIENT
Start: 2022-12-03 | End: 2022-12-03 | Stop reason: SDUPTHER

## 2022-12-03 RX ORDER — ONDANSETRON 2 MG/ML
4 INJECTION INTRAMUSCULAR; INTRAVENOUS EVERY 4 HOURS PRN
Status: DISCONTINUED | OUTPATIENT
Start: 2022-12-03 | End: 2022-12-08 | Stop reason: HOSPADM

## 2022-12-03 RX ORDER — METRONIDAZOLE 500 MG/100ML
500 INJECTION, SOLUTION INTRAVENOUS EVERY 6 HOURS
Status: DISCONTINUED | OUTPATIENT
Start: 2022-12-03 | End: 2022-12-03 | Stop reason: HOSPADM

## 2022-12-03 RX ORDER — SODIUM CHLORIDE, SODIUM LACTATE, POTASSIUM CHLORIDE, CALCIUM CHLORIDE 600; 310; 30; 20 MG/100ML; MG/100ML; MG/100ML; MG/100ML
INJECTION, SOLUTION INTRAVENOUS ONCE
Status: DISCONTINUED | OUTPATIENT
Start: 2022-12-03 | End: 2022-12-03 | Stop reason: HOSPADM

## 2022-12-03 RX ORDER — SODIUM CHLORIDE 0.9 % (FLUSH) 0.9 %
5-40 SYRINGE (ML) INJECTION EVERY 12 HOURS SCHEDULED
Status: DISCONTINUED | OUTPATIENT
Start: 2022-12-03 | End: 2022-12-03 | Stop reason: HOSPADM

## 2022-12-03 RX ADMIN — GENTAMICIN SULFATE 160 MG: 40 INJECTION, SOLUTION INTRAMUSCULAR; INTRAVENOUS at 17:55

## 2022-12-03 RX ADMIN — METRONIDAZOLE 500 MG: 500 INJECTION, SOLUTION INTRAVENOUS at 18:11

## 2022-12-03 RX ADMIN — SODIUM CHLORIDE: 9 INJECTION, SOLUTION INTRAVENOUS at 17:42

## 2022-12-03 RX ADMIN — SUGAMMADEX 200 MG: 100 INJECTION, SOLUTION INTRAVENOUS at 19:13

## 2022-12-03 RX ADMIN — DEXAMETHASONE SODIUM PHOSPHATE 4 MG: 4 INJECTION, SOLUTION INTRAMUSCULAR; INTRAVENOUS at 17:45

## 2022-12-03 RX ADMIN — PHENYLEPHRINE HYDROCHLORIDE 100 MCG: 10 INJECTION INTRAVENOUS at 17:51

## 2022-12-03 RX ADMIN — SUGAMMADEX 200 MG: 100 INJECTION, SOLUTION INTRAVENOUS at 19:19

## 2022-12-03 RX ADMIN — FENTANYL CITRATE 50 MCG: 50 INJECTION INTRAMUSCULAR; INTRAVENOUS at 18:15

## 2022-12-03 RX ADMIN — SODIUM CHLORIDE, POTASSIUM CHLORIDE, SODIUM LACTATE AND CALCIUM CHLORIDE: 600; 310; 30; 20 INJECTION, SOLUTION INTRAVENOUS at 19:26

## 2022-12-03 RX ADMIN — IOPAMIDOL 75 ML: 755 INJECTION, SOLUTION INTRAVENOUS at 11:51

## 2022-12-03 RX ADMIN — FENTANYL CITRATE 50 MCG: 50 INJECTION INTRAMUSCULAR; INTRAVENOUS at 17:32

## 2022-12-03 RX ADMIN — SUCCINYLCHOLINE CHLORIDE 140 MG: 20 INJECTION, SOLUTION INTRAMUSCULAR; INTRAVENOUS at 17:32

## 2022-12-03 RX ADMIN — PHENYLEPHRINE HYDROCHLORIDE 100 MCG: 10 INJECTION INTRAVENOUS at 17:46

## 2022-12-03 RX ADMIN — ONDANSETRON 4 MG: 2 INJECTION INTRAMUSCULAR; INTRAVENOUS at 19:07

## 2022-12-03 RX ADMIN — PROPOFOL 170 MG: 10 INJECTION, EMULSION INTRAVENOUS at 17:32

## 2022-12-03 RX ADMIN — SODIUM CHLORIDE, POTASSIUM CHLORIDE, SODIUM LACTATE AND CALCIUM CHLORIDE 1000 ML: 600; 310; 30; 20 INJECTION, SOLUTION INTRAVENOUS at 22:30

## 2022-12-03 RX ADMIN — LIDOCAINE HYDROCHLORIDE 100 MG: 20 INJECTION, SOLUTION EPIDURAL; INFILTRATION; INTRACAUDAL; PERINEURAL at 17:32

## 2022-12-03 RX ADMIN — FENTANYL CITRATE 50 MCG: 50 INJECTION INTRAMUSCULAR; INTRAVENOUS at 18:45

## 2022-12-03 RX ADMIN — SODIUM CHLORIDE 5 ML: 9 INJECTION INTRAMUSCULAR; INTRAVENOUS; SUBCUTANEOUS at 17:11

## 2022-12-03 RX ADMIN — PIPERACILLIN AND TAZOBACTAM 3375 MG: 3; .375 INJECTION, POWDER, FOR SOLUTION INTRAVENOUS at 17:10

## 2022-12-03 RX ADMIN — SODIUM CHLORIDE, POTASSIUM CHLORIDE, SODIUM LACTATE AND CALCIUM CHLORIDE: 600; 310; 30; 20 INJECTION, SOLUTION INTRAVENOUS at 17:23

## 2022-12-03 RX ADMIN — ENOXAPARIN SODIUM 40 MG: 100 INJECTION SUBCUTANEOUS at 22:37

## 2022-12-03 RX ADMIN — LABETALOL HYDROCHLORIDE 10 MG: 5 INJECTION, SOLUTION INTRAVENOUS at 20:07

## 2022-12-03 RX ADMIN — SODIUM CHLORIDE, PRESERVATIVE FREE 10 ML: 5 INJECTION INTRAVENOUS at 22:30

## 2022-12-03 RX ADMIN — ROPIVACAINE HYDROCHLORIDE 55 ML: 5 INJECTION EPIDURAL; INFILTRATION; PERINEURAL at 17:11

## 2022-12-03 RX ADMIN — DEXAMETHASONE SODIUM PHOSPHATE 10 MG: 10 INJECTION, SOLUTION INTRAMUSCULAR; INTRAVENOUS at 17:11

## 2022-12-03 RX ADMIN — SODIUM CHLORIDE, POTASSIUM CHLORIDE, SODIUM LACTATE AND CALCIUM CHLORIDE: 600; 310; 30; 20 INJECTION, SOLUTION INTRAVENOUS at 17:50

## 2022-12-03 RX ADMIN — FENTANYL CITRATE 50 MCG: 50 INJECTION INTRAMUSCULAR; INTRAVENOUS at 17:04

## 2022-12-03 RX ADMIN — DEXTROSE, SODIUM CHLORIDE, AND POTASSIUM CHLORIDE: 5; .45; .15 INJECTION INTRAVENOUS at 23:39

## 2022-12-03 RX ADMIN — ROCURONIUM BROMIDE 50 MG: 10 INJECTION, SOLUTION INTRAVENOUS at 17:42

## 2022-12-03 ASSESSMENT — ENCOUNTER SYMPTOMS
VOMITING: 1
NAUSEA: 1
SHORTNESS OF BREATH: 0
ABDOMINAL PAIN: 1
ABDOMINAL DISTENTION: 0
SORE THROAT: 0
DIARRHEA: 0
BACK PAIN: 0

## 2022-12-03 ASSESSMENT — PAIN DESCRIPTION - LOCATION: LOCATION: ABDOMEN

## 2022-12-03 ASSESSMENT — PAIN SCALES - GENERAL
PAINLEVEL_OUTOF10: 6
PAINLEVEL_OUTOF10: 0

## 2022-12-03 ASSESSMENT — PAIN DESCRIPTION - FREQUENCY: FREQUENCY: INTERMITTENT

## 2022-12-03 ASSESSMENT — PAIN DESCRIPTION - DESCRIPTORS: DESCRIPTORS: CRAMPING;SHARP

## 2022-12-03 ASSESSMENT — PAIN DESCRIPTION - PAIN TYPE: TYPE: ACUTE PAIN

## 2022-12-03 ASSESSMENT — PAIN - FUNCTIONAL ASSESSMENT: PAIN_FUNCTIONAL_ASSESSMENT: 0-10

## 2022-12-03 ASSESSMENT — PAIN DESCRIPTION - ORIENTATION: ORIENTATION: RIGHT;LEFT

## 2022-12-03 NOTE — CONSENT
Informed Consent for Blood Component Transfusion Note    I have discussed with the patient the rationale for blood component transfusion; its benefits in treating or preventing fatigue, organ damage, or death; and its risk which includes mild transfusion reactions, rare risk of blood borne infection, or more serious but rare reactions. I have discussed the alternatives to transfusion, including the risk and consequences of not receiving transfusion. The patient had an opportunity to ask questions and had agreed to proceed with transfusion of blood components.     Electronically signed by Giulia Garcia MD on 12/3/22 at 3:38 PM EST

## 2022-12-03 NOTE — ED PROVIDER NOTES
[facility] ED  EMERGENCY DEPARTMENT ENCOUNTER      Pt Name: Madhu Bose  MRN: [id number]  [place] 1955  Date of evaluation: 12/3/2022  Provider: Chikis Brewster, [address] [place] [address]       Chief Complaint   Patient presents with    Abdominal Pain     Lower abd. pain w/ associated persistent vomiting and lack of BM since Wednesday. Pt. reports he vomits anything he tries to eat/drink, no known hx of obstruction, low grade fever at home         HISTORY OF PRESENT ILLNESS   (Location/Symptom, Timing/Onset, Context/Setting, Quality, Duration, Modifying Factors, Severity)  Note limiting factors. Madhu Bose is a 74 y.o. male who presents to the emergency department with complains of diffuse abdominal pain for the past 3 days. Patient states that he has not had bowel movement for the past 3 days. He denies any known history of bowel obstruction. He has had 2 hernia surgeries and recently he noticed a lump in his right groin which she does not know if it was present previously or if it is new. Patient states that he has not been able to keep any liquids or solid foods down for the past 3 days. He has to take his pills with a sip of fluids. 30 fever or chills. He denies any urinary symptoms. Patient complains of a low-grade fever at home. He currently rates his pain at a 7 out of 10. He declines wanting any pain medication at this time. REVIEW OF SYSTEMS    (2-9 systems for level 4, 10 or more for level 5)     Review of Systems   Constitutional:  Negative for fatigue and fever. HENT:  Negative for congestion and sore throat. Eyes:  Negative for visual disturbance. Respiratory:  Negative for shortness of breath. Cardiovascular:  Negative for chest pain and palpitations. Gastrointestinal:  Positive for abdominal pain, nausea and vomiting. Negative for abdominal distention and diarrhea. Genitourinary:  Negative for difficulty urinating, dysuria and frequency.    Musculoskeletal: Negative for back pain. Skin:  Negative for rash. Psychiatric/Behavioral:  Negative for suicidal ideas. Except as noted above the remainder of the review of systems was reviewed and negative. PAST MEDICAL HISTORY     Past Medical History:   Diagnosis Date    Abnormal echocardiogram 3/12, 9/17/12    at least mild-moderate prosthetic mitral valve stenosis. mean gradient of 9-13 mm mercury. Mild MR. EF 60-65%. CHF (congestive heart failure) (HCC)     Essential hypertension     Generalized osteoarthritis of multiple sites     GERD (gastroesophageal reflux disease)     H/O mitral valve replacement with tissue graft 2012    Bovine Valve    Mixed hyperlipidemia     Paroxysmal atrial flutter ([address] 10/1/12    Spontaneous resolution. History of A. fib surrounding mitral valve surgery. Sleep apnea     Ulcerative colitis [facility])          SURGICAL HISTORY       Past Surgical History:   Procedure Laterality Date    CARDIAC SURGERY      COLONOSCOPY W/ BIOPSIES  11/22/2022    in [place] per patient report    ESOPHAGUS SURGERY  2021    paraesophageal hernia repair    HERNIA REPAIR      MITRAL VALVE REPLACEMENT  2012    Bovine    SHOULDER SURGERY Right     right dislocation         CURRENT MEDICATIONS       Previous Medications    EZETIMIBE (ZETIA) 10 MG TABLET    Take 10 mg by mouth daily    FUROSEMIDE (LASIX) 80 MG TABLET    Take 1 tablet by mouth in the morning. HANDICAP PLACARD MISC    by Does not apply route Duration; 3 years    MAGNESIUM OXIDE (MAG-OX) 400 MG TABLET    Take 420 mg by mouth every other day    PREDNISONE (DELTASONE) 10 MG TABLET    4 tabs daily for 3 days, 3 tabs daily for 3 days, 2 tabs daily for 3 days, 1 tabs daily for 3 days    SULFASALAZINE (AZULFIDINE) 500 MG TABLET    Take 1,000 mg by mouth 4 times daily. VITAMIN D 1000 UNITS CAPS    Take by mouth    WARFARIN (COUMADIN) 5 MG TABLET    5 mg 5 days weekly and 7,5 mg 2 days weekly.        ALLERGIES     Statins support therapy, Niacin and related, and Zetia [ezetimibe]    FAMILY HISTORY       Family History   Problem Relation Age of Onset    Cancer Mother     Diabetes Father           SOCIAL HISTORY       Social History     Socioeconomic History    Marital status:      Spouse name: None    Number of children: None    Years of education: None    Highest education level: None   Tobacco Use    Smoking status: Never    Smokeless tobacco: Never   Vaping Use    Vaping Use: Never used   Substance and Sexual Activity    Alcohol use: Yes     Comment: ocassional social drinker    Drug use: No    Sexual activity: Yes     Partners: Female     Social Determinants of Health     Financial Resource Strain: Low Risk     Difficulty of Paying Living Expenses: Not hard at all   Food Insecurity: No Food Insecurity    Worried About [address] [facility] in the Last Year: Never true    [address] in the Last Year: Never true   Transportation Needs: No Transportation Needs    Lack of Transportation (Medical): No    Lack of Transportation (Non-Medical):  No   Physical Activity: Insufficiently Active    Days of Exercise per Week: 3 days    Minutes of Exercise per Session: 30 min   Stress: No Stress Concern Present    Feeling of Stress : Not at all   Social Connections: Socially Integrated    Frequency of Communication with Friends and Family: Twice a week    Frequency of Social Gatherings with Friends and Family: Once a week    Attends Caodaism Services: More than 4 times per year    Active Member of [address] [place] [address] [employer] or Organizations: Yes    Attends Club or Organization Meetings: More than 4 times per year    Marital Status:    Intimate Partner Violence: Not At Risk    Fear of Current or Ex-Partner: No    Emotionally Abused: No    Physically Abused: No    Sexually Abused: No       SCREENINGS        [place] Coma Scale  Eye Opening: Spontaneous  Best Verbal Response: Oriented  Best Motor Response: Obeys commands  Mirza Coma Scale Score: 15               PHYSICAL EXAM    (up to 7 for level 4, 8 or more for level 5)     ED Triage Vitals [12/03/22 1001]   BP Temp Temp Source Heart Rate Resp SpO2 Height Weight   132/79 98.3 °F (36.8 °C) Oral 98 21 94 % 6' 2\" (1.88 m) 210 lb (95.3 kg)       Physical Exam  Constitutional:       General: He is not in acute distress. Appearance: Normal appearance. He is not toxic-appearing. HENT:      Head: Normocephalic and atraumatic. Mouth/Throat:      Mouth: Mucous membranes are moist.   Eyes:      Extraocular Movements: Extraocular movements intact. Pupils: Pupils are equal, round, and reactive to light. Cardiovascular:      Rate and Rhythm: Normal rate and regular rhythm. Pulses: Normal pulses. Heart sounds: Normal heart sounds. Pulmonary:      Effort: Pulmonary effort is normal.      Breath sounds: Normal breath sounds. Abdominal:      General: Abdomen is flat. Bowel sounds are normal.      Palpations: Abdomen is soft. Tenderness: There is abdominal tenderness (Moderate tenderness to palpation and hypertympanic percussion sounds in the left upper abdomen. There is a noticeable golf ball size lump in the right groin area, nontender to palpation.) in the periumbilical area and left upper quadrant. Musculoskeletal:         General: Normal range of motion. Skin:     General: Skin is warm and dry. Capillary Refill: Capillary refill takes less than 2 seconds. Neurological:      General: No focal deficit present. Mental Status: He is alert and oriented to person, place, and time.    Psychiatric:         Mood and Affect: Mood normal.       DIAGNOSTIC RESULTS     EKG: All EKG's are interpreted by the Emergency Department Physician who either signs or Co-signs this chart in the absence of a cardiologist.        RADIOLOGY:   Non-plain film images such as CT, Ultrasound and MRI are read by the radiologist. Plain radiographic images are visualized and preliminarily interpreted by the emergency physician with the below findings:      Interpretation per the Radiologist below, if available at the time of this note:    CT ABDOMEN PELVIS W IV CONTRAST Additional Contrast? None   Final Result   Small bowel containing indirect right inguinal hernia with complete bowel   obstruction. LABS:  Labs Reviewed   CBC WITH AUTO DIFFERENTIAL - Abnormal; Notable for the following components:       Result Value    Seg Neutrophils 84 (*)     Lymphocytes 8 (*)     Segs Absolute 8.40 (*)     Absolute Lymph # 0.83 (*)     All other components within normal limits   COMPREHENSIVE METABOLIC PANEL - Abnormal; Notable for the following components:    Glucose 140 (*)     Creatinine 1.29 (*)     Chloride 91 (*)     CO2 34 (*)     Alkaline Phosphatase 143 (*)     Total Protein 8.4 (*)     All other components within normal limits   LACTIC ACID   LIPASE   URINALYSIS WITH MICROSCOPIC   PROTIME-INR   TYPE AND SCREEN       All other labs were within normal range or not returned as of this dictation. EMERGENCY DEPARTMENT COURSE and DIFFERENTIAL DIAGNOSIS/MDM:   Vitals:    Vitals:    12/03/22 1001 12/03/22 1030 12/03/22 1100 12/03/22 1130   BP: 132/79 118/84 125/85 126/81   Pulse: 98      Resp: 21      Temp: 98.3 °F (36.8 °C)      TempSrc: Oral      SpO2: 94%      Weight: 210 lb (95.3 kg)      Height: 6' 2\" (1.88 m)          MDM    Patient presented with diffuse abdominal pain. CT scan showed findings of small bowel obstruction secondary to a right inguinal hernia. Patient has continued to decline taking any pain medications. I spoke with the surgeon on-call who requested putting an NG tube as well as a Carlson catheter. He also requested type and screen. I discussed results with the patient and his wife. We will admit the patient to the hospital for further management and evaluation. They understand and have no other questions or concerns.     REASSESSMENT     ED Course as of 12/03/22 1317   Sat Dec 03, 2022   1305  Dominik Christensen, putting an NG tube and a Carlson catheter. Also recommends doing a type and screen and admitting to the hospitalist. [JI]   [phone] with Dr. Olivia Murillo, hospitalist who will admit the patient. [JI]      ED Course User Index  [JI] Jemma Abraham DO         FINAL IMPRESSION      1.  Small bowel obstruction due to right inguinal hernia          DISPOSITION/PLAN   DISPOSITION Decision To Admit 12/03/2022 01:15:04 PM      (Please note that portions of this note were completed with a voice recognition program.  Efforts were made to edit the dictations but occasionally words are mis-transcribed.)    Jemma Abraham DO (electronically signed)  Attending Emergency Physician           Jemma Abraham DO  12/03/22 [id number]

## 2022-12-03 NOTE — PROGRESS NOTES
Spoke with Dr. Ernst Sanches at this time. He would like surgery to be called in for emergent abdominal exploration, possible bowel resection, and hernia repair. Called switchboard to call in surgery staff. Writer called anesthesia and spoke with them.

## 2022-12-03 NOTE — ANESTHESIA PRE PROCEDURE
Department of Anesthesiology  Preprocedure Note       Name:  Steve Julio   Age:  74 y.o.  :  1955                                          MRN:  [id number]         Date:  12/3/2022      Surgeon: Kenyetta Weber):  Ousmane Pantoja MD    Procedure: Procedure(s):  LAPAROTOMY EXPLORATORY    Medications prior to admission:   Prior to Admission medications    Medication Sig Start Date End Date Taking? Authorizing Provider   predniSONE (DELTASONE) 10 MG tablet 4 tabs daily for 3 days, 3 tabs daily for 3 days, 2 tabs daily for 3 days, 1 tabs daily for 3 days  Patient not taking: Reported on 12/3/2022 9/26/22   Silvestre Craft MD   ezetimibe (ZETIA) 10 MG tablet Take 10 mg by mouth daily  Patient not taking: Reported on 12/3/2022    Historical Provider, MD   furosemide (LASIX) 80 MG tablet Take 1 tablet by mouth in the morning. 8/10/22   Silvestre Craft MD   Handicap Placard MISC by Does not apply route Duration; 3 years 22   Silvestre Craft MD   magnesium oxide (MAG-OX) 400 MG tablet Take 420 mg by mouth every other day    Historical Provider, MD   vitamin D 1000 units CAPS Take by mouth    Historical Provider, MD   warfarin (COUMADIN) 5 MG tablet 5 mg 5 days weekly and 7,5 mg 2 days weekly. 16   Silvestre Craft MD   sulfaSALAzine (AZULFIDINE) 500 MG tablet Take 1,000 mg by mouth 4 times daily. Historical Provider, MD       Current medications:    Current Facility-Administered Medications   Medication Dose Route Frequency Provider Last Rate Last Admin     Bong Hold] 0.9 % sodium chloride infusion   IntraVENous PRN Ousmane Pantoja MD           Allergies:     Allergies   Allergen Reactions    Statins Support Therapy      Patient states gets a lot of muscle aches    Niacin And Related     Zetia [Ezetimibe]        Problem List:    Patient Active Problem List   Diagnosis Code    Paroxysmal atrial fibrillation (HCC) I48.0    Mitral stenosis I05.0    Paraesophageal hernia /  surgery  K44.9 Past Medical History:        Diagnosis Date    Abnormal echocardiogram 3/12, 9/17/12    at least mild-moderate prosthetic mitral valve stenosis. mean gradient of 9-13 mm mercury. Mild MR. EF 60-65%.  CHF (congestive heart failure) (HCC)     Essential hypertension     Generalized osteoarthritis of multiple sites     GERD (gastroesophageal reflux disease)     H/O mitral valve replacement with tissue graft 2012    Bovine Valve    Mixed hyperlipidemia     Paroxysmal atrial flutter ([address] 10/1/12    Spontaneous resolution. History of A. fib surrounding mitral valve surgery.     Sleep apnea     Ulcerative colitis ([address]        Past Surgical History:        Procedure Laterality Date    CARDIAC SURGERY      COLONOSCOPY W/ BIOPSIES  11/22/2022    in [place] per patient report    ESOPHAGUS SURGERY  2021    paraesophageal hernia repair    HERNIA REPAIR      MITRAL VALVE REPLACEMENT  2012    Bovine    SHOULDER SURGERY Right     right dislocation       Social History:    Social History     Tobacco Use    Smoking status: Never    Smokeless tobacco: Never   Substance Use Topics    Alcohol use: Yes     Comment: ocassional social drinker                                Counseling given: Not Answered      Vital Signs (Current):   Vitals:    12/03/22 1330 12/03/22 1400 12/03/22 1430 12/03/22 1557   BP: 121/70 (!) 116/97 123/76 109/79   Pulse:    99   Resp:    12   Temp:    36.8 °C (98.3 °F)   TempSrc:    Temporal   SpO2:    95%   Weight:       Height:                                                  BP Readings from Last 3 Encounters:   12/03/22 109/79   09/26/22 100/62   09/19/22 101/61       NPO Status: Time of last liquid consumption: 2100                        Time of last solid consumption: 2030                        Date of last liquid consumption: 12/01/22                        Date of last solid food consumption: 11/30/22    BMI:   Wt Readings from Last 3 Encounters:   12/03/22 210 lb (95.3 kg) 09/26/22 225 lb (102.1 kg)   09/19/22 226 lb (102.5 kg)     Body mass index is 26.96 kg/m². CBC:   Lab Results   Component Value Date/Time    WBC 10.1 12/03/2022 10:40 AM    RBC 5.36 12/03/2022 10:40 AM    HGB 16.7 12/03/2022 10:40 AM    HCT 49.5 12/03/2022 10:40 AM    MCV 92.4 12/03/2022 10:40 AM    RDW 13.4 12/03/2022 10:40 AM     12/03/2022 10:40 AM       CMP:   Lab Results   Component Value Date/Time     12/03/2022 10:40 AM    K 4.3 12/03/2022 10:40 AM    CL 91 12/03/2022 10:40 AM    CO2 34 12/03/2022 10:40 AM    BUN 23 12/03/2022 10:40 AM    CREATININE 1.29 12/03/2022 10:40 AM    GFRAA >60 12/23/2020 07:07 PM    LABGLOM >60 12/03/2022 10:40 AM    GLUCOSE 140 12/03/2022 10:40 AM    PROT 8.4 12/03/2022 10:40 AM    CALCIUM 10.4 12/03/2022 10:40 AM    BILITOT 1.1 12/03/2022 10:40 AM    ALKPHOS 143 12/03/2022 10:40 AM    AST 29 12/03/2022 10:40 AM    ALT 26 12/03/2022 10:40 AM       POC Tests: No results for input(s): POCGLU, POCNA, POCK, POCCL, POCBUN, POCHEMO, POCHCT in the last 72 hours.     Coags:   Lab Results   Component Value Date/Time    PROTIME 20.9 12/03/2022 01:21 PM    INR 1.8 12/03/2022 01:21 PM    APTT 42.7 12/23/2020 07:59 PM       HCG (If Applicable): No results found for: PREGTESTUR, PREGSERUM, HCG, HCGQUANT     ABGs: No results found for: PHART, PO2ART, RFO6KWT, FVQ5FDY, BEART, Q1UMPUXG     Type & Screen (If Applicable):  No results found for: LABABO, LABRH    Drug/Infectious Status (If Applicable):  No results found for: HIV, HEPCAB    COVID-19 Screening (If Applicable):   Lab Results   Component Value Date/Time    COVID19 Not Detected 12/23/2020 08:07 PM           Anesthesia Evaluation  Patient summary reviewed and Nursing notes reviewed no history of anesthetic complications:   Airway: Mallampati: II     Neck ROM: full  Mouth opening: > = 3 FB   Dental:    (+) partials  Comment: Upper and lower partials removed    Pulmonary: breath sounds clear to auscultation  (+) sleep apnea: on CPAP,                             Cardiovascular:  Exercise tolerance: good (>4 METS),   (+) hypertension: no interval change, valvular problems/murmurs:, CAD:, dysrhythmias: atrial flutter, CHF:,       ECG reviewed  Rhythm: irregular  Rate: normal    Stress test reviewed       Beta Blocker:  Not on Beta Blocker      ROS comment: Prosthetic mitral valve stenosis; paroxysmal atrial flutter     Neuro/Psych:   Negative Neuro/Psych ROS              GI/Hepatic/Renal:   (+) hiatal hernia, GERD: no interval change, PUD,           Endo/Other: Negative Endo/Other ROS                     ROS comment: Last coumadin dose 12/3/2022. INR: 1.8. Patient to receive 2 units of FFP prior to surgery Abdominal:       Abdomen: rigid and tender. PE comment: Bowel obstruction   Vascular: negative vascular ROS. Other Findings:           Anesthesia Plan      general and regional     ASA 3 - emergent       Induction: rapid sequence and intravenous. MIPS: Postoperative opioids intended and Prophylactic antiemetics administered. Anesthetic plan and risks discussed with patient. Use of blood products discussed with patient whom consented to blood products.            Post-op pain plan if not by surgeon: single peripheral nerve block            MODE Camara - CRISPIN   12/3/2022

## 2022-12-03 NOTE — ED NOTES
Contacted Dr Franchesca Arenas, surgery on call per Dr. Tay Chanel request.     Boston Cogan PRISCILLA Reser  12/03/22 7025

## 2022-12-03 NOTE — CONSULTS
Chief complaint right lower quadrant abdominal pain with abdominal distention and nausea and vomiting    Patient is a 77-year-old male with a prior history significant for Provine valve mitral replacement right inguinal hernia repair x2 on chronic Coumadin therapy for atrial flutter whom presents with a 2-day history of palpable mass in the right groin region increasing pain in that area with intractable nausea and vomiting his last normal bowel movement was 3 days ago he is not been unable to eat or drink due to the protracted nausea and vomiting. He denies hematemesis hematochezia or melena he last took his oral warfarin some 3 days ago. While in the emergency room patient was by CT scan found to have a strangulated right inguinal hernia with proximal bowel obstruction    Past medical history  1 paroxysmal atrial flutter  2. Mitral valve replacement with Bovienebowel bovine valve  3. Hypertension  4. Congestive heart failure  5. Gastroesophageal reflux disease  6. Hyperlipidemia  7 sleep apnea  8 ulcerative colitis    Past surgical history  1. Open heart surgery for mitral valve replacement  2. Left thoracotomy for Soffa GL surgery related to an intrathoracic stomach  3.  Multiple inguinal hernia repairs patient is unclear as to whether mesh was used  4. Surgery of right shoulder for dislocation  5. Colonoscopy just 1 month ago as a follow-up for his ulcerative colitis    Medications  1. Zytiga 10 mg p.o. once daily  2. Furosemide 80 mg p.o. daily  3. Magnesium oxide 400 mg p.o. every other day  4. Vitamin D 1000 unit caplet orally once a day  5. Warfarin 5 mg p.o. every day  6.   Sulfasalazine 1000 mg p.o. 4 times a day    Social history patient does not smoke he occasionally drinks    Family history noncontributory    Review of systems patient denies any vertigo diplopia no circumoral numbness dysarthria or dysphagia no hot or cold intolerance no history of jaundice izaiah colored stools or dark-colored urine appetite is generally good weight is stable no hematemesis hematochezia melena no blood dyscrasias    Physical examination an elderly male in a mild amount of distress with a nasogastric tube with bilious drainage  Vital signs are stable he is afebrile  HEENT: Normocephalic atraumatic extract muscles intact pupils equal round reactive to light and accommodate oropharynx is clear neck supple without adenopathy  Chest: Clear to auscultation percussion he has a well-healed left posterior thoracotomy 6 incision  Cardiovascular: Irregularly irregular rhythm without murmur gallop no JVD or pedal edema pulses are 2+ and symmetric  Abdomen: Soft without tenderness no guarding or rebound he has a palpable mass in the right groin region that is nonreducible he has normal male genitalia rectal examination is deferred per patient request he has well-healed inguinal incisions bilaterally  Musculoskeletal: 5/5 strength  Neurologically: Cranial nerves II through XII intact D10 reflexes are 2+ and symmetric patient has no focal findings    Laboratories patient has a creatinine of 1.29 a chloride of 91 CO2 of 34 glucose of 140 hepatic profile shows a mildly elevated alkaline phosphatase his white count is 10,000 and his H&H is 16 and 49 his platelet count 006,080 patient has a left shift prothrombin time is 20.9 urinalysis significant for specific gravity 1.030 moderate amount of bilirubin and considerable protein    CT scan shows a right inguinal hernia with incarcerated/strangulated small bowel with a complete bowel obstruction    Assessment and plan this is a patient with 2-1/2-day history of incarcerated/strangulated right inguinal hernia with bowel obstruction given his prior multiple procedures in the right groin region I am inclined to perform an abdominal exploration with a retroperitoneal approach to the inguinal canal for potential repair as well as reduction of the incarcerated/strangulated small bowel requiring a possible small bowel resection in anticipation of this major operation given his slightly elevated prothrombin time we will prophylactically give him 2 units of FFP prior to his surgical procedure as well as empiric antibiotics risk complications have been discussed with the patient and his family member and they wish to proceed emergently with surgery

## 2022-12-03 NOTE — ANESTHESIA PROCEDURE NOTES
Peripheral Block    Patient location during procedure: pre-op  Reason for block: post-op pain management and at surgeon's request  Start time: 12/3/2022 5:03 PM  End time: 12/3/2022 5:11 PM  Staffing  Performed: resident/CRNA   Resident/CRNA: Damien Lennox, APRN - CRNA  Preanesthetic Checklist  Completed: patient identified, IV checked, site marked, risks and benefits discussed, surgical/procedural consents, equipment checked, pre-op evaluation, timeout performed, anesthesia consent given, oxygen available, monitors applied/VS acknowledged and blood product R/B/A discussed and consented  Peripheral Block   Patient position: supine  Prep: ChloraPrep  Provider prep: mask and sterile gloves  Patient monitoring: continuous pulse ox, IV access and responsive to questions (cardiac monitor, NIBP, and oxygen readily available)  Block type: TAP  Laterality: bilateral  Injection technique: single-shot  Guidance: ultrasound guided  Local infiltration: ropivacaine and decadron  Infiltration strength: 0.5 %  Local infiltration: ropivacaine and decadron    Needle   Needle type:  Other   Needle gauge: 25 G  Needle localization: ultrasound guidance  Needle length: 11 cmOther needle type: [place] TAP  Assessment   Injection assessment: negative aspiration for heme, no paresthesia on injection, local visualized surrounding nerve on ultrasound, no intravascular symptoms and low pressure verified by pressure monitor  Paresthesia pain: none  Slow fractionated injection: yes  Hemodynamics: stable  Real-time US image taken/store: yes  Outcomes: uncomplicated and patient tolerated procedure well    Additional Notes  55 mL 0.5% ropivacaine- 5 mL PFNS divided evenly between bilateral TAP blocks  10 mg decadron divided proportionally between 2 injection sites

## 2022-12-04 ENCOUNTER — APPOINTMENT (OUTPATIENT)
Dept: GENERAL RADIOLOGY | Age: 67
End: 2022-12-04
Payer: OTHER GOVERNMENT

## 2022-12-04 LAB
ABSOLUTE EOS #: 0 K/UL (ref 0–0.44)
ABSOLUTE IMMATURE GRANULOCYTE: 0 K/UL (ref 0–0.3)
ABSOLUTE LYMPH #: 0.43 K/UL (ref 1.1–3.7)
ABSOLUTE MONO #: 0.86 K/UL (ref 0.1–1.2)
ALBUMIN SERPL-MCNC: 3.9 G/DL (ref 3.5–5.2)
ALBUMIN/GLOBULIN RATIO: 1.6 (ref 1–2.5)
ALP BLD-CCNC: 99 U/L (ref 40–129)
ALT SERPL-CCNC: 22 U/L (ref 5–41)
ANION GAP SERPL CALCULATED.3IONS-SCNC: 11 MMOL/L (ref 9–17)
ARM BAND NUMBER: NORMAL
AST SERPL-CCNC: 30 U/L
BASOPHILS # BLD: 0 % (ref 0–2)
BASOPHILS ABSOLUTE: 0 K/UL (ref 0–0.2)
BILIRUB SERPL-MCNC: 1 MG/DL (ref 0.3–1.2)
BLD PROD TYP BPU: NORMAL
BLD PROD TYP BPU: NORMAL
BLOOD BANK BLOOD PRODUCT EXPIRATION DATE: NORMAL
BLOOD BANK BLOOD PRODUCT EXPIRATION DATE: NORMAL
BLOOD BANK ISBT PRODUCT BLOOD TYPE: 8400
BLOOD BANK ISBT PRODUCT BLOOD TYPE: 8400
BLOOD BANK PRODUCT CODE: NORMAL
BLOOD BANK PRODUCT CODE: NORMAL
BLOOD BANK UNIT TYPE AND RH: NORMAL
BLOOD BANK UNIT TYPE AND RH: NORMAL
BPU ID: NORMAL
BPU ID: NORMAL
BUN BLDV-MCNC: 22 MG/DL (ref 8–23)
BUN/CREAT BLD: 22 (ref 9–20)
CALCIUM SERPL-MCNC: 8.9 MG/DL (ref 8.6–10.4)
CHLORIDE BLD-SCNC: 99 MMOL/L (ref 98–107)
CO2: 28 MMOL/L (ref 20–31)
CREAT SERPL-MCNC: 1 MG/DL (ref 0.7–1.2)
DISPENSE STATUS BLOOD BANK: NORMAL
DISPENSE STATUS BLOOD BANK: NORMAL
EKG ATRIAL RATE: 99 BPM
EKG P-R INTERVAL: 188 MS
EKG Q-T INTERVAL: 422 MS
EKG QRS DURATION: 150 MS
EKG QTC CALCULATION (BAZETT): 541 MS
EKG R AXIS: -97 DEGREES
EKG T AXIS: 19 DEGREES
EKG VENTRICULAR RATE: 99 BPM
EOSINOPHILS RELATIVE PERCENT: 0 % (ref 1–4)
GFR SERPL CREATININE-BSD FRML MDRD: >60 ML/MIN/1.73M2
GLUCOSE BLD-MCNC: 225 MG/DL (ref 70–99)
HCT VFR BLD CALC: 38.7 % (ref 40.7–50.3)
HEMOGLOBIN: 13 G/DL (ref 13–17)
IMMATURE GRANULOCYTES: 0 %
INR BLD: 1.9
LYMPHOCYTES # BLD: 4 % (ref 24–43)
MCH RBC QN AUTO: 31 PG (ref 25.2–33.5)
MCHC RBC AUTO-ENTMCNC: 33.6 G/DL (ref 28.4–34.8)
MCV RBC AUTO: 92.1 FL (ref 82.6–102.9)
MONOCYTES # BLD: 8 % (ref 3–12)
MORPHOLOGY: NORMAL
NRBC AUTOMATED: 0 PER 100 WBC
PDW BLD-RTO: 13.3 % (ref 11.8–14.4)
PLATELET # BLD: 202 K/UL (ref 138–453)
PMV BLD AUTO: 10.9 FL (ref 8.1–13.5)
POTASSIUM SERPL-SCNC: 4.6 MMOL/L (ref 3.7–5.3)
PROTHROMBIN TIME: 21.7 SEC (ref 11.5–14.2)
RBC # BLD: 4.2 M/UL (ref 4.21–5.77)
SEG NEUTROPHILS: 88 % (ref 36–65)
SEGMENTED NEUTROPHILS ABSOLUTE COUNT: 9.51 K/UL (ref 1.5–8.1)
SODIUM BLD-SCNC: 138 MMOL/L (ref 135–144)
TOTAL PROTEIN: 6.4 G/DL (ref 6.4–8.3)
TRANSFUSION STATUS: NORMAL
TRANSFUSION STATUS: NORMAL
UNIT DIVISION: 0
UNIT DIVISION: 0
UNIT ISSUE DATE/TIME: NORMAL
UNIT ISSUE DATE/TIME: NORMAL
WBC # BLD: 10.8 K/UL (ref 3.5–11.3)

## 2022-12-04 PROCEDURE — 93010 ELECTROCARDIOGRAM REPORT: CPT | Performed by: INTERNAL MEDICINE

## 2022-12-04 PROCEDURE — 74019 RADEX ABDOMEN 2 VIEWS: CPT

## 2022-12-04 PROCEDURE — 6360000002 HC RX W HCPCS: Performed by: SPECIALIST

## 2022-12-04 PROCEDURE — 80053 COMPREHEN METABOLIC PANEL: CPT

## 2022-12-04 PROCEDURE — 97162 PT EVAL MOD COMPLEX 30 MIN: CPT

## 2022-12-04 PROCEDURE — 94761 N-INVAS EAR/PLS OXIMETRY MLT: CPT

## 2022-12-04 PROCEDURE — 2700000000 HC OXYGEN THERAPY PER DAY

## 2022-12-04 PROCEDURE — 1200000000 HC SEMI PRIVATE

## 2022-12-04 PROCEDURE — 97166 OT EVAL MOD COMPLEX 45 MIN: CPT

## 2022-12-04 PROCEDURE — 85025 COMPLETE CBC W/AUTO DIFF WBC: CPT

## 2022-12-04 PROCEDURE — 36415 COLL VENOUS BLD VENIPUNCTURE: CPT

## 2022-12-04 PROCEDURE — 2580000003 HC RX 258: Performed by: SPECIALIST

## 2022-12-04 PROCEDURE — 2500000003 HC RX 250 WO HCPCS: Performed by: SPECIALIST

## 2022-12-04 PROCEDURE — 6360000002 HC RX W HCPCS: Performed by: INTERNAL MEDICINE

## 2022-12-04 PROCEDURE — 85610 PROTHROMBIN TIME: CPT

## 2022-12-04 PROCEDURE — 97530 THERAPEUTIC ACTIVITIES: CPT

## 2022-12-04 RX ORDER — ENOXAPARIN SODIUM 100 MG/ML
1 INJECTION SUBCUTANEOUS DAILY
Status: DISCONTINUED | OUTPATIENT
Start: 2022-12-05 | End: 2022-12-05

## 2022-12-04 RX ORDER — OMEPRAZOLE 20 MG/1
20 CAPSULE, DELAYED RELEASE ORAL NIGHTLY
COMMUNITY

## 2022-12-04 RX ADMIN — DEXTROSE, SODIUM CHLORIDE, AND POTASSIUM CHLORIDE: 5; .45; .15 INJECTION INTRAVENOUS at 15:35

## 2022-12-04 RX ADMIN — PIPERACILLIN AND TAZOBACTAM 3375 MG: 3; .375 INJECTION, POWDER, FOR SOLUTION INTRAVENOUS at 08:50

## 2022-12-04 RX ADMIN — ENOXAPARIN SODIUM 40 MG: 100 INJECTION SUBCUTANEOUS at 08:50

## 2022-12-04 RX ADMIN — DEXTROSE, SODIUM CHLORIDE, AND POTASSIUM CHLORIDE: 5; .45; .15 INJECTION INTRAVENOUS at 07:44

## 2022-12-04 RX ADMIN — PIPERACILLIN AND TAZOBACTAM 3375 MG: 3; .375 INJECTION, POWDER, FOR SOLUTION INTRAVENOUS at 00:29

## 2022-12-04 RX ADMIN — PIPERACILLIN AND TAZOBACTAM 3375 MG: 3; .375 INJECTION, POWDER, FOR SOLUTION INTRAVENOUS at 16:17

## 2022-12-04 RX ADMIN — PIPERACILLIN AND TAZOBACTAM 3375 MG: 3; .375 INJECTION, POWDER, FOR SOLUTION INTRAVENOUS at 23:54

## 2022-12-04 RX ADMIN — DEXTROSE, SODIUM CHLORIDE, AND POTASSIUM CHLORIDE: 5; .45; .15 INJECTION INTRAVENOUS at 23:39

## 2022-12-04 NOTE — PROGRESS NOTES
Physical Therapy  Facility/Department: [facility] AT THE [facility] MED SURG  Physical Therapy Initial Assessment    Name: Todd Dominguez  : 1955  MRN: [id number]  Date of Service: 2022    Discharge Recommendations:  Home independently   PT Equipment Recommendations  Equipment Needed: No      Patient Diagnosis(es): The primary encounter diagnosis was Small bowel obstruction due to right inguinal hernia. A diagnosis of Partial intestinal obstruction, unspecified cause ([address] was also pertinent to this visit. Past Medical History:  has a past medical history of Abnormal echocardiogram, CHF (congestive heart failure) ([address] Essential hypertension, Generalized osteoarthritis of multiple sites, GERD (gastroesophageal reflux disease), H/O mitral valve replacement with tissue graft, Mixed hyperlipidemia, [address]oxysmal atrial flutter ([facility] Sleep apnea, and Ulcerative colitis ([facility] Past Surgical History:  has a past surgical history that includes Mitral valve replacement (); hernia repair; shoulder surgery (Right); Cardiac surgery; Esophagus surgery (); Colonoscopy w/ biopsies (2022); and Exploratory laparotomy w/ bowel resection (2022). Assessment   Body Structures, Functions, Activity Limitations Requiring Skilled Therapeutic Intervention: Decreased functional mobility ; Decreased ADL status; Decreased strength;Decreased balance;Decreased high-level IADLs  Assessment: DX SBO,R INGUINAL HERNIA,SUPERVISION GAIT FWW,SUPERVISION TRANSFERS. PT NECESSARY TO ADDRESS THJESE DEFICITS.   Therapy Prognosis: Good  Decision Making: Medium Complexity  Requires PT Follow-Up: Yes  Activity Tolerance  Activity Tolerance: Patient tolerated treatment well     Plan   Physcial Therapy Plan  General Plan: 2 times a day 7 days a week  Current Treatment Recommendations: Strengthening, ROM, Balance training, Functional mobility training, Transfer training, ADL/Self-care training, Gait training, Neuromuscular re-education, Safety education & training, Patient/Caregiver education & training  Safety Devices  Type of Devices: Call light within reach, Left in chair, Nurse notified  Restraints  Restraints Initially in Place: No     Restrictions  Restrictions/Precautions  Restrictions/Precautions: NPO  Required Braces or Orthoses?: No     Subjective   Pain: PAIN 0/10  General  Chart Reviewed: Yes  Patient assessed for rehabilitation services?: Yes  Additional Pertinent Hx: SMAL BOWEL OBSTRUCTION,R INGUINAL HERNAI. Response To Previous Treatment: Patient with no complaints from previous session. Family / Caregiver Present: No  Diagnosis: SMALL BOWEL OBSTRUCTION ,R INGUINAL HERNIA. Follows Commands: Within Functional Limits  Subjective  Subjective: PAIN 0/10         Social/Functional History  Social/Functional History  Lives With: Spouse  Type of Home: House  Home Layout: Multi-level  Home Access: Stairs to enter with rails  Entrance Stairs - Number of Steps: 6-12  Bathroom Shower/Tub: Tub/Shower unit  Bathroom Toilet: Handicap height  Has the patient had two or more falls in the past year or any fall with injury in the past year?: Yes  ADL Assistance: Independent  Homemaking Assistance: Independent  Ambulation Assistance: Independent  Transfer Assistance: Independent  Active : Yes  Vision/Hearing       Cognition   Orientation  Overall Orientation Status: Within Normal Limits  Cognition  Overall Cognitive Status: WNL     Objective   Heart Rate: 89  Heart Rate Source: Monitor; Apical  BP: 119/74  BP Location: Left upper arm  BP Method: Automatic  Patient Position: Semi fowlers  MAP (Calculated): 89  Resp: 18  SpO2: (S) 98 %  O2 Device: (S) Nasal cannula     Observation/Palpation  Posture: Good        AROM RLE (degrees)  RLE AROM: WFL  AROM LLE (degrees)  LLE AROM : WFL  AROM RUE (degrees)  RUE AROM : WFL  AROM LUE (degrees)  LUE AROM : WFL  Strength RLE  Strength RLE: WFL  Strength LLE  Strength LLE: WFL  Strength RUE  Strength RUE: WFL  Strength LUE  Strength LUE: WFL        Bed Mobility Training  Bed Mobility Training: Yes  Overall Level of Assistance: Supervision  Interventions: Demonstration  Rolling: Supervision  Supine to Sit: Supervision  Sit to Supine: Supervision  Scooting: Supervision  Balance  Sitting: Intact  Standing: Intact  Transfer Training  Transfer Training: Yes  Overall Level of Assistance: Supervision  Interventions: Demonstration  Sit to Stand: Supervision  Stand to Sit: Supervision  Stand Pivot Transfers: Supervision  Bed to Chair: Supervision  Gait Training  Gait Training: Yes  Right Side Weight Bearing: As tolerated  Left Side Weight Bearing: As tolerated  Gait  Overall Level of Assistance: Supervision  Interventions: Demonstration  Distance (ft): 100 Feet  Assistive Device: Walker, rolling                 Exercise Treatment: THER EX 4 EXT        OutComes Score                                                  AM-PAC Score             Tinneti Score       Goals  Short Term Goals  Time Frame for Short Term Goals: 3 DAYS  Short Term Goal 1: IND TRANSFERS  Short Term Goal 2: IND GAIT NO DEVICES 150 FT. Long Term Goals  Time Frame for Long Term Goals : 2 WEEKS  Long Term Goal 1: IND GAIT 400 FT NO DEVICES.   Patient Goals   Patient Goals : RETURN HOME IND GAIT NO DEVICES       Education  Patient Education  Education Given To: Patient  Education Provided: Transfer Training (GAIT WITH FWW)  Education Method: Demonstration  Education Outcome: Verbalized understanding      Therapy Time   Individual Concurrent Group Co-treatment   Time In 1110         Time Out 1140         Minutes 30         Timed Code Treatment Minutes: Mich Dejesus, PT

## 2022-12-04 NOTE — PROGRESS NOTES
Pharmacy Note  Warfarin Consult    David Connolly is a 74 y.o. male for whom pharmacy has been consulted to manage warfarin therapy. Consulting Physician: Dr. Huyen Berman  Reason for Admission: Bowel Obstruction    Warfarin dose prior to admission: 7.5mg on Mondays, 5mg on all other days  Warfarin indication: afib  Target INR range: 2-3     Past Medical History:   Diagnosis Date    Abnormal echocardiogram 3/12, 9/17/12    at least mild-moderate prosthetic mitral valve stenosis. mean gradient of 9-13 mm mercury. Mild MR. EF 60-65%. CHF (congestive heart failure) (HCC)     Essential hypertension     Generalized osteoarthritis of multiple sites     GERD (gastroesophageal reflux disease)     H/O mitral valve replacement with tissue graft 2012    Bovine Valve    Mixed hyperlipidemia     Paroxysmal atrial flutter ([address] 10/1/12    Spontaneous resolution. History of A. fib surrounding mitral valve surgery. Sleep apnea     Ulcerative colitis ([facility] [address]                 Recent Labs     12/03/22  1321   INR 1.8     Recent Labs     12/03/22  1040   HGB 16.7   HCT 49.5          Current warfarin drug-drug interactions: Acetaminophen      Date             INR        Dose   12/3/2022            1.8       5mg    Daily PT/INR while inpatient. PT/INR ordered to start 12/4/22 with morning labs. Thank you for the consult. Will continue to follow.

## 2022-12-04 NOTE — PROGRESS NOTES
Discharge Criteria    Inpatients must meet Criteria 1 through 7. All other patients are either YES or N/A. If a NO is chosen then Anesthesia or Surgeon must be notified. 1.  Minimum 30 minutes after last dose of sedative medication, minimum 120 minutes after last dose of reversal agent. Yes      2. Systolic BP stable within 20 mmHg for 30 minutes & systolic BP between 90 & 315 or within 10 mmHg of baseline. Yes      3. Pulse between 60 and 100 or within 10 bpm of baseline. Yes      4. Spontaneous respiratory rate >/= 10 per minute. Yes      5. SaO2 >/= 95 or  >/= baseline. Yes      6. Able to cough and swallow or return to baseline function. Yes      7. Alert and oriented or return to baseline mental status. Yes      8. Demonstrates controlled, coordinated movements, ambulates with steady gait, or return to baseline activity function. N/A      9. Minimal or no pain or nausea, or at a level tolerable and acceptable to patient. N/A      10. Takes and retains oral fluids as allowed. N/A      11. Procedural / perioperative site stable. Minimal or no bleeding. N/A          12. If GI endoscopy procedure, minimal or no abdominal distention or passing flatus. N/A      13. Written discharge instructions and emergency telephone number provided. N/A      14. Accompanied by a responsible adult.     N/A

## 2022-12-04 NOTE — PLAN OF CARE
Problem: Discharge Planning  Goal: Discharge to home or other facility with appropriate resources  Outcome: Progressing    Discharge plan is home with wife upon being medically cleared. LSW providing assistance for discharge needs. Will continue to monitor. Problem: Pain  Goal: Verbalizes/displays adequate comfort level or baseline comfort level  12/4/2022 1424 by Esther Méndez RN  Outcome: Progressing   Patient encouraged to let staff know when pain is present and to request pain medication when needing it. Non-pharmaceutical measures encouraged, rest, repositioning, and splinting. Problem: Safety - Adult  Goal: Free from fall injury  12/4/2022 1424 by Esther Méndez RN  Outcome: Progressing   Patient's bed in lowest position. Bed/chair wheel locked. Call light within reach. Non-skid socks worn. Bedside table within reach. Bedrails up x 2. Patient is up with a walker and assistance from one person. Will continue to monitor. Problem: Skin/Tissue Integrity  Goal: Absence of new skin breakdown  Description: 1. Monitor for areas of redness and/or skin breakdown  2. Assess vascular access sites hourly  Outcome: Progressing   Patient turn/repositioned every 2 hours. Patient encouraged to ambulate and use restroom every 2 hours. Patient assessed for wet bottoms as needed. Patient assessed for skin breakdown. Heels floated while in chair and bed. Problem: Gastrointestinal - Adult  Goal: Maintains or returns to baseline bowel function  Outcome: Progressing   Monitor bowel sounds, assess NG tube and output from NG tube.

## 2022-12-04 NOTE — FLOWSHEET NOTE
Patient titrated to room air at this time. Patient tolerated it well and writer will continue to monitor.

## 2022-12-04 NOTE — PROGRESS NOTES
Barajas catheter removed at this time per Dr. Violet Hughes order/protocol. 750 mL of urine emptied from the barajas bag. 9 mL of saline removed from balloon. Patient tolerated removal well. Will continue to monitor output.

## 2022-12-04 NOTE — FLOWSHEET NOTE
Patient tolerated the 2L NC O2 so patient titrated down to 1L NC O2 and stating well. Will continue to monitor.

## 2022-12-04 NOTE — CONSULTS
Clinical Pharmacy Note    Isis Ricks is a 74 y.o. male for whom pharmacy has been asked to manage warfarin therapy. Consulting Physician: Dr. Wei Pillai  Reason for Admission: Bowel Obstruction     Warfarin dose prior to admission: 7.5mg on Mondays, 5mg on all other days  Warfarin indication: afib  Target INR range: 2-3    Recent Labs     12/03/22  1040 12/04/22  0616   HGB 16.7 13.0   HCT 49.5 38.7*    202       Current warfarin drug-drug interactions: Zosyn 3.375 grams IV every 8 hours and Acetaminophen PRN (none given in 24 hours)      Date             INR        Dose   12/4/2022        1.9         on HOLD (NPO d/t SBO)   12/3/2022        1.8         not given (NPO)    Assessment/Plan:   Patient had exploratory laparotomy with Dr. Antonio Guajardo on 12/3/2022 for right inguinal hernia repair with small bowel obstruction and is currently NPO post-operatively. Will continue therapeutic dose of Lovenox (1 mg/kg) until patient is able to resume oral medications (including warfarin) and INR is therapeutic. Pharmacy will continue to monitor daily PT/INR until stable and within therapeutic range. Thank you for the consult.  If you have any questions or concerns, please contact pharmacy at [phone], PharmD

## 2022-12-04 NOTE — PROGRESS NOTES
Postoperative day #1  Patient is without complaint  Vital signs are stable he is afebrile has adequate urine output  HEENT normocephalic atraumatic chest: Clear to auscultation percussion  Cardiovascular regular rate and rhythm without murmur gallop  Abdomen dry dressing is intact abdomen is soft bowel sounds are hypoactive he has modest bilious NG output Carlson catheter is draining less concentrated urine  Neurologic without focal findings  Musculoskeletal without calf tenderness    Laboratories H&H of 13 and 38    Assessment and plan patient is doing reasonably well we will discontinue his Carlson catheter continue the nasogastric tube encourage patient to be up in a chair and to ambulate with assist several times a day

## 2022-12-04 NOTE — H&P
Patient:  Shirley Valentine  MRN: [id number]    Chief Complaint:    Chief Complaint   Patient presents with    Abdominal Pain     Lower abd. pain w/ associated persistent vomiting and lack of BM since Wednesday. Pt. reports he vomits anything he tries to eat/drink, no known hx of obstruction, low grade fever at home       History Obtained From:  patient, electronic medical record    PCP: Suma Liu MD    History of Present Illness: The patient is a 74 y.o. male who presents with 58-year-old male with a history of abdominal pain and vomiting. Feeling terrible. Diagnosed with incarcerated and hernia. Past Medical History:        Diagnosis Date    Abnormal echocardiogram 3/12, 9/17/12    at least mild-moderate prosthetic mitral valve stenosis. mean gradient of 9-13 mm mercury. Mild MR. EF 60-65%. CHF (congestive heart failure) (HCC)     Essential hypertension     Generalized osteoarthritis of multiple sites     GERD (gastroesophageal reflux disease)     H/O mitral valve replacement with tissue graft 2012    Bovine Valve    Mixed hyperlipidemia     Paroxysmal atrial flutter ([address] 10/1/12    Spontaneous resolution. History of A. fib surrounding mitral valve surgery. Sleep apnea     Ulcerative colitis [facility])        Past Surgical History:        Procedure Laterality Date    CARDIAC SURGERY      COLONOSCOPY W/ BIOPSIES  11/22/2022    in [place] per patient report    ESOPHAGUS SURGERY  2021    paraesophageal hernia repair    EXPLORATORY LAPAROTOMY W/ BOWEL RESECTION  12/03/2022    Dr. Chani Tsang - with right inguinal hernia repair with mesh    HERNIA REPAIR      MITRAL VALVE REPLACEMENT  2012    Bovine    SHOULDER SURGERY Right     right dislocation       Family History:       Problem Relation Age of Onset    Cancer Mother     Diabetes Father        Social History:   TOBACCO:   reports that he has never smoked. He has never used smokeless tobacco.  ETOH:   reports current alcohol use.       Allergies:  Statins support therapy, Niacin and related, and Zetia [ezetimibe]    Medications Prior to Admission:    Prior to Admission medications    Medication Sig Start Date End Date Taking? Authorizing Provider   predniSONE (DELTASONE) 10 MG tablet 4 tabs daily for 3 days, 3 tabs daily for 3 days, 2 tabs daily for 3 days, 1 tabs daily for 3 days  Patient not taking: Reported on 12/3/2022 9/26/22   Sheridan Prajapati MD   ezetimibe (ZETIA) 10 MG tablet Take 10 mg by mouth daily  Patient not taking: Reported on 12/3/2022    Historical Provider, MD   furosemide (LASIX) 80 MG tablet Take 80 mg by mouth daily as needed 8/10/22   Sheridan Prajapati MD   Handicap Placard MISC by Does not apply route Duration; 3 years 5/17/22   Sheridan Prajapati MD   magnesium oxide (MAG-OX) 400 MG tablet Take 420 mg by mouth every other day    Historical Provider, MD   vitamin D 1000 units CAPS Take by mouth    Historical Provider, MD   warfarin (COUMADIN) 5 MG tablet 5 mg 5 days weekly and 7,5 mg 2 days weekly. Patient taking differently: 5 mg 6 days weekly and 7.5mg on Mondays 1/4/16   Sheridan Prajapati MD   sulfaSALAzine (AZULFIDINE) 500 MG tablet Take 1,000 mg by mouth 4 times daily. Historical Provider, MD       Review of Systems:  Constitutional:negative  for fevers, and negative for chills.   Eyes: negative for visual disturbance   ENT: negative for sore throat, negative nasal congestion, and negative for earache  Respiratory: negative for shortness of breath, negative for cough, and negative for wheezing  Cardiovascular: negative for chest pain, negative for palpitations, and negative for syncope  Gastrointestinal: positive for abdominal pain, positive for nausea,positive for vomiting, negative for diarrhea, negative for constipation, and negative for hematochezia or melena  Genitourinary: negative for dysuria, negative for urinary urgency, negative for urinary frequency, and negative for hematuria  Skin: negative for skin rash, and negative for skin lesions  Neurological: negative for unilateral weakness, numbness or tingling. Physical Exam:    Vitals:   Temp: 97.1 °F (36.2 °C)  BP: 119/74  Resp: 18  Heart Rate: 89  SpO2: 93 %  24HR INTAKE/OUTPUT:    Intake/Output Summary (Last 24 hours) at 12/4/2022 0743  Last data filed at 12/4/2022 0400  Gross per 24 hour   Intake 4866 ml   Output 2325 ml   Net 2541 ml       Exam:  GEN:  alert & appropriate appearance  EYES: normal eyes, non icteric  NECK: supple with no lymphadenopathy,  no bruits noted  PULM: clear with no rales or rhonchi. No wheezes  COR: Irregular  ABD:  Some abdominal distention but overall pretty soft. Midline incision. NG tube noted.   EXT:   no edema noted  NEURO: follows commands, SOARES, no deficits  SKIN:  negative  -----------------------------------------------------------------  Diagnostic Data:   Lab Results   Component Value Date    WBC 10.8 12/04/2022    HGB 13.0 12/04/2022     12/04/2022       Lab Results   Component Value Date    BUN 22 12/04/2022    CREATININE 1.00 12/04/2022     12/04/2022    K 4.6 12/04/2022    CALCIUM 8.9 12/04/2022    CL 99 12/04/2022    CO2 28 12/04/2022    LABGLOM >60 12/04/2022       Lab Results   Component Value Date    WBCUA 0 TO 2 12/03/2022    RBCUA None 12/03/2022    EPITHUA None 12/03/2022    LEUKOCYTESUR NEGATIVE 12/03/2022    SPECGRAV >1.030 (H) 12/03/2022    GLUCOSEU NEGATIVE 12/03/2022    KETUA 1+ (A) 12/03/2022    PROTEINU 2+ (A) 12/03/2022    HGBUR NEGATIVE 12/03/2022    CASTUA NOT REPORTED 06/05/2020    CRYSTUA NOT REPORTED 06/05/2020    BACTERIA 1+ (A) 12/03/2022    YEAST NOT REPORTED 06/05/2020       Lab Results   Component Value Date    TROPONINT NOT REPORTED 12/23/2020    PROBNP 186 12/23/2020       CT ABDOMEN PELVIS W IV CONTRAST Additional Contrast? None    Result Date: 12/3/2022  EXAMINATION: CT OF THE ABDOMEN AND PELVIS WITH CONTRAST 12/3/2022 11:49 am TECHNIQUE: CT of the abdomen and pelvis was performed with the administration of intravenous contrast. Multiplanar reformatted images are provided for review. Automated exposure control, iterative reconstruction, and/or weight based adjustment of the mA/kV was utilized to reduce the radiation dose to as low as reasonably achievable. COMPARISON: 12/23/2020 HISTORY: ORDERING SYSTEM PROVIDED HISTORY: abdominal x 3 days; nausea, vomiting, no BM x 3 days TECHNOLOGIST PROVIDED HISTORY: abdominal x 3 days; nausea, vomiting, no BM x 3 days Decision Support Exception - unselect if not a suspected or confirmed emergency medical condition->Emergency Medical Condition (MA) FINDINGS: Lower Chest: There are multiple calcified pleural plaques throughout the lung bases. Organs: The liver, pancreas, spleen, kidneys and adrenals are unremarkable. GI/Bowel: There is a 6.2 x 5.5 cm small bowel containing indirect right inguinal hernia. This results in complete bowel obstruction with moderate to severe distention of the stomach and small bowel the in to this point. The distal small bowel and colon are decompressed. Pelvis: The bladder and prostate are unremarkable. Peritoneum/Retroperitoneum: There is no free air, free fluid or intraperitoneal inflammatory change. There is no adenopathy. Bones/Soft Tissues: There is no acute fracture or aggressive osseous lesion. Small bowel containing indirect right inguinal hernia with complete bowel obstruction. XR ABDOMEN FOR NG/OG/NE TUBE PLACEMENT    Result Date: 12/3/2022  EXAMINATION: ONE SUPINE XRAY VIEW(S) OF THE ABDOMEN 12/3/2022 2:22 pm COMPARISON: None. HISTORY: ORDERING SYSTEM PROVIDED HISTORY: Confirmation of course of NG/OG/NE tube and location of tip of tube TECHNOLOGIST PROVIDED HISTORY: Confirmation of course of NG/OG/NE tube and location of tip of tube Portable? ->Yes FINDINGS: A nasogastric tube is been placed, the tip of which terminates approximately 2 cm above the left hemidiaphragm.   Multiple calcified pleural plaques are again demonstrated within the lung bases. NG tube placement as above. Advancement by 10 cm recommended. Assessment:    Principal Problem: Bowel obstruction (HCC)  Active Problems:    Paroxysmal atrial fibrillation (HCC)  Resolved Problems:    * No resolved hospital problems. *      Patient Active Problem List    Diagnosis Date Noted    Bowel obstruction ([facility] 2022    Paraesophageal hernia /  surgery 2021    Paroxysmal atrial fibrillation ([facility] 10/05/2012    Mitral stenosis 10/05/2012       Plan: This patient requires inpatient admission because of bowel obstruction from incarcerated hernia  Factors affecting the medical complexity of this patient include Principal Problem: Bowel obstruction (HCC)  Active Problems:    Paroxysmal atrial fibrillation (HCC)  Resolved Problems:    * No resolved hospital problems. *    Estimated length of stay is 4 days  Fluids. NG. Lovenox. NPO. High risk medication monitorin    CORE MEASURES  Core measures including DVT prophylaxis, Code Status, Nutrition, Therapy Options, chart reviewed and advance directives reviewed at this visit.     Jaye Méndez MD, MD  2022, 7:43 AM

## 2022-12-04 NOTE — PROGRESS NOTES
Patient up to the bathroom at this time, patient able to urinate 300 mL of urine. Patient ambulating in the hallway at this time with wife.

## 2022-12-04 NOTE — ANESTHESIA POSTPROCEDURE EVALUATION
Department of Anesthesiology  Postprocedure Note    Patient: Shirley Valentine  MRN: [id number]  YOB: 1955  Date of evaluation: 12/3/2022      Procedure Summary     Date: 12/03/22 Room / Location: [address] [place] [address]    Anesthesia Start: 1723 Anesthesia Stop: 1933    Procedure: LAPAROTOMY EXPLORATORY  WITH RIGHT INGUINAL HERNIA REPAIRWITH MESH  AND SMALL BOWEL RESECTION (Abdomen) Diagnosis:       Partial intestinal obstruction, unspecified cause ([address]      (bowel obstruction)    Surgeons: Umang Alva MD Responsible Provider: MODE Neely CRNA    Anesthesia Type: general, regional ASA Status: 3 - Emergent          Anesthesia Type: No value filed.     Avelina Phase I: Avelina Score: 9    Avelina Phase II:        Anesthesia Post Evaluation    Patient location during evaluation: PACU  Patient participation: complete - patient participated  Level of consciousness: awake  Airway patency: patent  Nausea & Vomiting: no vomiting and no nausea  Complications: no  Cardiovascular status: blood pressure returned to baseline and hemodynamically stable  Respiratory status: acceptable, spontaneous ventilation and nasal cannula  Hydration status: stable  Multimodal analgesia pain management approach

## 2022-12-04 NOTE — PROGRESS NOTES
Entered patient's room for afternoon vital signs and head to toe reassessment. Patient resting in the chair at this time. A&O x4, calm, and cooperative. Patient denies pain other than when coughing and sitting down. Patient denies wanting any pain medication. Vital signs and head to toe reassessment completed at this time, see flowsheets for more details. Patient ambulated 200 ft in the murphy with the writer. Patient tolerated ambulating well and complained of no pain. Patient denies no more needs at this time. Call light within reach. Chair alarm on. Chair/bed wheels locked. Bed in lowest position. Family at the bedside. Will continue to monitor patient.

## 2022-12-04 NOTE — PROGRESS NOTES
Pt came up from surgery in the bed. Pt is A&O x4. Vitals and assessment as charted. Pt has no drainage noted on his abdominal dressing. Pt denies pain at this time. Pt was able to answer all admission questions. Family at bedside. Call light within reach. Bed alarm on.

## 2022-12-04 NOTE — PROGRESS NOTES
Entered patient's room for morning vital signs and head to toe assessment. Patient asleep in the bed at this time. A&O x4, calm, and cooperative. Patient denies no pain at this time, except when he coughs, splinting incision encouraged. Vital signs and head to toe assessment completed at this time, see flowsheets for more details. Abdominal incision dressing clean, dry, and intact. Abdominal sounds hypoactive in all four quadrants. Patient denies no more needs at this time. Call light within reach. Bed alarm on. Bed wheels locked. Bed in lowest position. Will continue to monitor patient.

## 2022-12-04 NOTE — PROGRESS NOTES
Pt resting in bed with eyes closed. Pt awakens easily. Vitals and assessment as charted. Pt denies pain. Pt denies any further needs at this time. Call light within reach. Bed alarm on.

## 2022-12-04 NOTE — PROGRESS NOTES
Occupational Therapy  Facility/Department: [facility]  Occupational Therapy Initial Assessment    Name: Margareth Graham  : 1955  MRN: [id number]  Date of Service: 2022    Discharge Recommendations:  Continue to assess pending progress, Home with assist PRN          Patient Diagnosis(es): The primary encounter diagnosis was Small bowel obstruction due to right inguinal hernia. A diagnosis of Partial intestinal obstruction, unspecified cause ([address] was also pertinent to this visit. Past Medical History:  has a past medical history of Abnormal echocardiogram, CHF (congestive heart failure) ([address] Essential hypertension, Generalized osteoarthritis of multiple sites, GERD (gastroesophageal reflux disease), H/O mitral valve replacement with tissue graft, Mixed hyperlipidemia, Paroxysmal atrial flutter ([address] Sleep apnea, and Ulcerative colitis ([address] Past Surgical History:  has a past surgical history that includes Mitral valve replacement (); hernia repair; shoulder surgery (Right); Cardiac surgery; Esophagus surgery (); Colonoscopy w/ biopsies (2022); and Exploratory laparotomy w/ bowel resection (2022). Treatment Diagnosis: weakness      Assessment   Performance deficits / Impairments: Decreased functional mobility ; Decreased endurance;Decreased ADL status; Decreased balance  Assessment: 75 y/o M admitted to T for SBO c ex-tap. Patient presents with generalized weakness, requiring assist during I/ADL and mobility. Patient would benefit from OT services to ensure safe and indep return home. Treatment Diagnosis: weakness  Prognosis: Good  Decision Making: Medium Complexity  REQUIRES OT FOLLOW-UP: Yes        Plan   Occupational Therapy Plan  Times Per Day:  Once a day  Days Per Week: 7 Days  Current Treatment Recommendations: Strengthening, Functional mobility training, Endurance training, Safety education & training, Patient/Caregiver education & training, Equipment evaluation, education, & procurement, Self-Care / ADL, Home management training     Restrictions  Restrictions/Precautions  Restrictions/Precautions: NPO  Required Braces or Orthoses?: No    Subjective   General  Patient assessed for rehabilitation services?: Yes  Subjective  Subjective: Patient sitting in chair upon arrival, denies pain, agreeable to OT evaluation. Social/Functional History  Social/Functional History  Lives With: Spouse  Type of Home: House  Home Layout: Multi-level  Home Access: Stairs to enter with rails  Entrance Stairs - Number of Steps: 6-12  Bathroom Shower/Tub: Tub/Shower unit  Bathroom Toilet: Handicap height  Has the patient had two or more falls in the past year or any fall with injury in the past year?: Yes  ADL Assistance: Independent  Homemaking Assistance: Independent  Ambulation Assistance: Independent  Transfer Assistance: Independent  Active : Yes       Objective   Heart Rate: 89  Heart Rate Source: Monitor; Apical  BP: 119/74  BP Location: Left upper arm  BP Method: Automatic  Patient Position: Semi fowlers  MAP (Calculated): 89  Resp: 18  SpO2: (S) 98 %  O2 Device: (S) Nasal cannula          Observation/Palpation  Posture: Good  Observation: NG tube and 2L O2  Safety Devices  Type of Devices: Call light within reach;Nurse notified; All fall risk precautions in place; Left in chair  Balance  Sitting: Intact  Standing: With support  Gait  Overall Level of Assistance: Stand-by assistance  Assistive Device: Walker, rolling     AROM: Within functional limits  PROM: Within functional limits  Strength: Generally decreased, functional  Coordination: Generally decreased, functional  Tone: Normal  Sensation: Intact  ADL  Feeding: NPO  Grooming: Stand by assistance  UE Bathing: Stand by assistance  LE Bathing: Minimal assistance  UE Dressing: Stand by assistance  LE Dressing: Minimal assistance  Toileting: Stand by assistance           Transfers  Sit to stand: Stand by assistance  Stand to sit: Stand by assistance  Vision  Vision: Impaired  Vision Exceptions: Wears glasses at all times  Hearing  Hearing: Within functional limits  Cognition  Overall Cognitive Status: WNL  Orientation  Overall Orientation Status: Within Normal Limits                  Education Given To: Patient  Education Provided: Role of Therapy;Plan of Care  Education Method: Verbal  Barriers to Learning: None  Education Outcome: Verbalized understanding                        AM-PAC Score        AM-PAC Inpatient Daily Activity Raw Score: 19 (12/04/22 1136)  AM-PAC Inpatient ADL T-Scale Score : 40.22 (12/04/22 1136)  ADL Inpatient CMS 0-100% Score: 42.8 (12/04/22 1136)  ADL Inpatient CMS G-Code Modifier : CK (12/04/22 1136)         Goals  Short Term Goals  Time Frame for Short Term Goals: 21 visits  Short Term Goal 1: Patient to be educated on AE/DME and d/c folder to ensure safe and indep return home. Short Term Goal 2: Patient to engage in 15 minutes of standing during ther ex/ther act and/or fxl task of choce to improve standing kendall/bal and safety during ADL. Short Term Goal 3: Patient to complete ADL routine c mod I c use of AE as needed to ensure safe return home.        Therapy Time   Individual Concurrent Group Co-treatment   Time In 1100         Time Out 1120         Minutes 20                 Anne Rangel OTR/L

## 2022-12-04 NOTE — OP NOTE
Operative Note      Patient: Kimberly Champagne  YOB: 1955  MRN: [id number]    Date of Procedure: 12/3/2022    Pre-Op Diagnosis: bowel obstruction    Post-Op Diagnosis:  Patient had a strangulated right recurrent inguinal hernia with proximal small bowel obstruction       Procedure(s):  LAPAROTOMY EXPLORATORY reduction of strangulated small bowel from recurrent right inguinal hernia enterectomy with side to side anastomosis and closure of the right inguinal hernia recurrent with a retroperitoneal approach using Bard macro mesh    Surgeon(s):  Nathaniel Xiao MD    Assistant:   * No surgical staff found *    Anesthesia: General    Estimated Blood Loss (mL): 642     Complications: None    Specimens:   ID Type Source Tests Collected by Time Destination   A :  Tissue Ileum SURGICAL PATHOLOGY Nathaniel Xiao MD 12/3/2022 1840        Implants:  Implant Name Type Inv. Item Serial No.  Lot No. LRB No. Used Action   MESH DELLA [id number] INGUINAL POLYPR SYN FLAT L PORE MFIL - [id number]  MESH DELLA [id number] INGUINAL POLYPR SYN FLAT L PORE MFIL  BARD DAVOL-WD [id number] Right 1 Implanted         Drains:   NG/OG/NJ/NE Tube Nasogastric 18 fr Left nostril (Active)   Surrounding Skin Clean, dry & intact 12/03/22 1412   Securement device Tape 12/03/22 1412   Status Suction-low intermittent 12/03/22 1929   Placement Verified Respiratory Status;X-Ray (Initial); Gastric Contents 12/03/22 1412   NG/OG/NJ/NE External Measurement (cm) 75 cm 12/03/22 1509   Drainage Appearance Green 12/03/22 1929   Output (mL) 1300 ml 12/03/22 1547   Action Taken Repositioned 12/03/22 1509       Urinary Catheter 12/03/22 Carlson (Active)   $ Urethral catheter insertion Inserted for procedure 12/03/22 1338   Catheter Indications Perioperative use for selected surgical procedures 12/03/22 1338   Urine Color Harper 12/03/22 1929   Urine Appearance Clear 12/03/22 1929   Collection Container Standard 12/03/22 1929   Securement Method Securing device (Describe) 12/03/22 1929   Catheter Best Practices  Drainage tube clipped to bed;Catheter secured to thigh; Bag below bladder;Bag not on floor; Lack of dependent loop in tubing;Drainage bag less than half full 12/03/22 1929   Status Draining 12/03/22 1929       Findings: Patient had strangulated small bowel and a recurrent right inguinal hernia    Detailed Description of Procedure:   Patient was prepped and draped preoperative attempts at reduction of the inguinal hernia were unsuccessful a midline incision was made and carried through into the abdomen cursory examination of the abdomen showed only the strangulated small bowel in the right inguinal canal with some difficulty the bowel was reduced appeared to have significant ischemic changes however I decided to allow the bowel to sit for a while while I proceeded to perform a retroperitoneal dissection and attempts to expose the inguinal canal and inguinal vessels epididymis and respective ligaments with the intent of repairing this hernia the hernia sac was dissected out bluntly however this was with great difficulty due to prior operative adhesions associated with prior placed mesh it appears in the retroperitoneal position eventually the anatomic landmarks were found however in doing so considerable amount of the peritoneum was damaged requiring separate closure with a 2-0 Vicryl running continuous stitch attention was then turned back to the small bowel which still appeared compromised so a small interval 4 inch segment of compromised bowel was taken with JANIE proximally and distally the intervening mesentery was clamped divided and tied off with 0 Vicryl simple interrupted's a side to side enterotomy was created with a JANIE followed by a TA 60 to close the residual defect 3-0 Vicryl was placed at the apex of the original suture line of the JANIE and the dogears were inverted as well then a 2-0 Vicryl was used to close the mesentery at this point the abdomen was irrigated aspirated gloves were changed and then the mesh was brought into the field and placed over the hernia defect using a laparoscopic tacking device the mesh was tacked to the the pre-existing mesh in the area of the pubic symphysis and then tacked along the inguinal ligament inferiorly to the medial aspect of the femoral vein 1 tack was placed laterally and then along the superior aspect of the mesh at 1 cm intervals gaining good coverage of the inguinal defect the peritoneum and bowel contents were allowed to fall back into place multiple liters of saline were used to irrigate the area and then the abdomen was closed first the peritoneum was closed with #1 Vicryl running continuous stitch then the linea alba was closed with #1 Vicryl running continuous stitch the adipose was irrigated and then the skin was loosely closed with surgical staples all needle and sponge counts were reported to be correct the right testicle was found to be within the right hemiscrotum this point patient was taken recovery room in stable condition    Electronically signed by Elizabeth Mena MD on 12/3/2022 at 7:45 PM

## 2022-12-04 NOTE — PLAN OF CARE
Problem: Pain  Goal: Verbalizes/displays adequate comfort level or baseline comfort level  Outcome: Progressing  Note: Pt is able to verbalize when in pain. Pt denies pain at this time. Will continue to monitor. Problem: Safety - Adult  Goal: Free from fall injury  Outcome: Progressing  Note: Pt will remain free from injury. Wheels locked on bed and call light within reach. Will continue to monitor. Problem: Gastrointestinal - Adult  Goal: Minimal or absence of nausea and vomiting  Outcome: Progressing  Note: Pt denies nausea/vomiting. Pt has NG-tube in his left nares, will continue to monitor.

## 2022-12-04 NOTE — FLOWSHEET NOTE
Patient tolerating 3L NC O2 at this time and spot checks show patient is stating well. Patient titrated down to 2L NC O2 at this time. Will continue to monitor.

## 2022-12-05 PROBLEM — E43 SEVERE MALNUTRITION (HCC): Status: ACTIVE | Noted: 2022-12-05

## 2022-12-05 PROCEDURE — 6360000002 HC RX W HCPCS: Performed by: SPECIALIST

## 2022-12-05 PROCEDURE — 97116 GAIT TRAINING THERAPY: CPT

## 2022-12-05 PROCEDURE — 2500000003 HC RX 250 WO HCPCS: Performed by: NURSE PRACTITIONER

## 2022-12-05 PROCEDURE — 2580000003 HC RX 258: Performed by: SPECIALIST

## 2022-12-05 PROCEDURE — 97110 THERAPEUTIC EXERCISES: CPT

## 2022-12-05 PROCEDURE — 1200000000 HC SEMI PRIVATE

## 2022-12-05 PROCEDURE — 2580000003 HC RX 258: Performed by: INTERNAL MEDICINE

## 2022-12-05 PROCEDURE — 2500000003 HC RX 250 WO HCPCS: Performed by: SPECIALIST

## 2022-12-05 PROCEDURE — 2580000003 HC RX 258: Performed by: NURSE PRACTITIONER

## 2022-12-05 PROCEDURE — A4216 STERILE WATER/SALINE, 10 ML: HCPCS | Performed by: NURSE PRACTITIONER

## 2022-12-05 PROCEDURE — 6360000002 HC RX W HCPCS: Performed by: INTERNAL MEDICINE

## 2022-12-05 RX ORDER — ENOXAPARIN SODIUM 100 MG/ML
1 INJECTION SUBCUTANEOUS 2 TIMES DAILY
Status: DISCONTINUED | OUTPATIENT
Start: 2022-12-05 | End: 2022-12-07

## 2022-12-05 RX ADMIN — ENOXAPARIN SODIUM 100 MG: 100 INJECTION SUBCUTANEOUS at 20:54

## 2022-12-05 RX ADMIN — PIPERACILLIN AND TAZOBACTAM 3375 MG: 3; .375 INJECTION, POWDER, FOR SOLUTION INTRAVENOUS at 09:02

## 2022-12-05 RX ADMIN — FAMOTIDINE 20 MG: 10 INJECTION INTRAVENOUS at 20:54

## 2022-12-05 RX ADMIN — PIPERACILLIN AND TAZOBACTAM 3375 MG: 3; .375 INJECTION, POWDER, FOR SOLUTION INTRAVENOUS at 17:22

## 2022-12-05 RX ADMIN — DEXTROSE, SODIUM CHLORIDE, AND POTASSIUM CHLORIDE: 5; .45; .15 INJECTION INTRAVENOUS at 09:00

## 2022-12-05 RX ADMIN — ENOXAPARIN SODIUM 100 MG: 100 INJECTION SUBCUTANEOUS at 09:02

## 2022-12-05 RX ADMIN — SODIUM CHLORIDE, PRESERVATIVE FREE 10 ML: 5 INJECTION INTRAVENOUS at 20:58

## 2022-12-05 RX ADMIN — FAMOTIDINE 20 MG: 10 INJECTION INTRAVENOUS at 13:32

## 2022-12-05 NOTE — PROGRESS NOTES
Progress Note    SUBJECTIVE:    Patient seen for f/u of Bowel obstruction ([address] He resting in bed pain better controlled. No complaints. No flatus . NG in place    ROS:   Constitutional: negative  for fevers, and negative for chills. Respiratory: negative for shortness of breath, negative for cough, and negative for wheezing  Cardiovascular: negative for chest pain, and negative for palpitations  Gastrointestinal: positive for abdominal pain, negative for nausea,negative for vomiting, negative for diarrhea, and negative for constipation     All other systems were reviewed with the patient and are negative unless otherwise stated in HPI      OBJECTIVE:      Vitals:   Vitals:    12/05/22 0708   BP: 127/79   Pulse: 94   Resp: 18   Temp: 97.7 °F (36.5 °C)   SpO2: 95%     Weight: 214 lb 3.2 oz (97.2 kg)   Height: 6' 2\" (188 cm)     Weight  Wt Readings from Last 3 Encounters:   12/05/22 214 lb 3.2 oz (97.2 kg)   09/26/22 225 lb (102.1 kg)   09/19/22 226 lb (102.5 kg)     Body mass index is 27.5 kg/m². 24HR INTAKE/OUTPUT:      Intake/Output Summary (Last 24 hours) at 12/5/2022 0732  Last data filed at 12/5/2022 0439  Gross per 24 hour   Intake 3237 ml   Output 2100 ml   Net 1137 ml     -----------------------------------------------------------------  Exam:    GEN:    Awake, alert and oriented x3. EYES:  EOMI, pupils equal   NECK: Supple. No lymphadenopathy. No carotid bruit  CVS:    irregularly irregular, no audible murmur  PULM:  diminished but clear without wheezing, rales or rhonchi, no acute respiratory distress  ABD:    Bowels sounds absent. Abdomen is soft. No distention. no tenderness to palpation. Dressing CDI  EXT:   no edema bilaterally . No calf tenderness. NEURO: Moves all extremities. Motor and sensory are grossly intact  SKIN:  No rashes.   No skin lesions.    -----------------------------------------------------------------    Diagnostic Data:      Complete Blood Count:   Recent Labs 12/03/22  1040 12/04/22  0616   WBC 10.1 10.8   RBC 5.36 4.20*   HGB 16.7 13.0   HCT 49.5 38.7*   MCV 92.4 92.1   MCH 31.2 31.0   MCHC 33.7 33.6   RDW 13.4 13.3    202   MPV 10.2 10.9        Last 3 Blood Glucose:   Recent Labs     12/03/22  1040 12/04/22  0616   GLUCOSE 140* 225*        Comprehensive Metabolic Profile:   Recent Labs     12/03/22  1040 12/04/22  0616    138   K 4.3 4.6   CL 91* 99   CO2 34* 28   BUN 23 22   CREATININE 1.29* 1.00   GLUCOSE 140* 225*   CALCIUM 10.4 8.9   PROT 8.4* 6.4   LABALBU 4.8 3.9   BILITOT 1.1 1.0   ALKPHOS 143* 99   AST 29 30   ALT 26 22        Urinalysis:   Lab Results   Component Value Date/Time    NITRU NEGATIVE 12/03/2022 01:39 PM    COLORU Yellow 12/03/2022 01:39 PM    PHUR 5.5 12/03/2022 01:39 PM    WBCUA 0 TO 2 12/03/2022 01:39 PM    RBCUA None 12/03/2022 01:39 PM    MUCUS 3+ 12/03/2022 01:39 PM    TRICHOMONAS NOT REPORTED 06/05/2020 01:45 AM    YEAST NOT REPORTED 06/05/2020 01:45 AM    BACTERIA 1+ 12/03/2022 01:39 PM    SPECGRAV >1.030 12/03/2022 01:39 PM    LEUKOCYTESUR NEGATIVE 12/03/2022 01:39 PM    UROBILINOGEN Normal 12/03/2022 01:39 PM    BILIRUBINUR MODERATE 12/03/2022 01:39 PM    GLUCOSEU NEGATIVE 12/03/2022 01:39 PM    KETUA 1+ 12/03/2022 01:39 PM    AMORPHOUS 2+ 12/03/2022 01:39 PM       HgBA1c:  No results found for: LABA1C    Lactic Acid:   Lab Results   Component Value Date/Time    LACTA 1.6 12/03/2022 10:40 AM    LACTA 1.7 12/23/2020 08:55 PM    LACTA 2.1 06/05/2020 12:05 AM        Radiology/Imaging:  XR ABDOMEN (2 VIEWS)   Final Result   Nonspecific, nonobstructive bowel gas pattern. XR ABDOMEN FOR NG/OG/NE TUBE PLACEMENT   Final Result   NG tube placement as above. Advancement by 10 cm recommended. CT ABDOMEN PELVIS W IV CONTRAST Additional Contrast? None   Final Result   Small bowel containing indirect right inguinal hernia with complete bowel   obstruction.                ASSESSMENT / PLAN:  Bowel obstruction ([address]  Continue current therapy   Appreciate general surgery  Postop day #1  Continue IV Zosyn  NG to low intermittent wall suction  Clamp NG and ambulate every 4 hours while awake  IV fluids  PAF  Therapeutic Lovenox  Hold Coumadin  Nutrition status:   obesity, non-morbid  Dietician consult initiated  Hospital Prophylaxis:   DVT: Lovenox   Stress Ulcer: H2 Blocker   High risk medications: none   Disposition:    Discharge plan is home      MODE Armenta - CNP , MODE, NP-C  Hospitalist Medicine        12/5/2022, 7:32 AM

## 2022-12-05 NOTE — PROGRESS NOTES
Pt assisted to bathroom and back to bed using walker, tolerated well. Pt voided 400 ml which was recorded in flow sheet. Pt then positioned per comfort in bed with call light and bedside table within reach. NGT reconnected to low intermittent suctioning. Pt denies any other needs at this time.

## 2022-12-05 NOTE — PROGRESS NOTES
[address] [place] [address] Note-Warfarin Follow-up       Patient:  Desmond Llamas  MRN: [id number]    Warfarin consult follow-up:    INR (no units)   Date Value   12/04/2022 1.9   12/03/2022 1.8   12/23/2020 2.6   06/04/2020 2.1       Hemoglobin (g/dL)   Date Value   12/04/2022 13.0   12/03/2022 16.7   12/23/2020 15.9     Hematocrit (%)   Date Value   12/04/2022 38.7 (L)   12/03/2022 49.5   12/23/2020 49.6     Platelets (k/uL)   Date Value   12/04/2022 202   12/03/2022 255   12/23/2020 250       Target INR Range: 2-3    Significant Drug-Drug Interactions:  New warfarin drug-drug interactions: zosyn and tylenol  Discontinued drug-drug interactions: none      Notes:                   Hold warfarin today due to being NPO. Lovenox 1 mg/kg BID  Daily PT/INR until stable within therapeutic range.      Thank you,  Stevan Jones, PharmD, 12/5/2022 12:49 PM

## 2022-12-05 NOTE — PROGRESS NOTES
Physical Therapy  Facility/Department: [facility] AT THE [facility] MED SURG  Daily Treatment Note  NAME: Ronda Luo  : 1955  MRN: [id number]  Date of Service: 2022  Discharge Recommendations:  Home independently   Patient Diagnosis(es): The primary encounter diagnosis was Small bowel obstruction due to right inguinal hernia. A diagnosis of Partial intestinal obstruction, unspecified cause ([address] was also pertinent to this visit. Assessment   Assessment: Gait [id number] with WW,SUP. Transfers/Bed mobility:SUP. Seated exercises B Le x20  Activity Tolerance: Patient tolerated treatment well   Plan    Physcial Therapy Plan  General Plan: 2 times a day 7 days a week  Current Treatment Recommendations: Strengthening;ROM;Balance training;Functional mobility training;Transfer training;ADL/Self-care training;Gait training;Neuromuscular re-education; Safety education & training;Patient/Caregiver education & training   Restrictions  Restrictions/Precautions  Restrictions/Precautions: NPO  Required Braces or Orthoses?: No   Subjective    Subjective  Subjective: Pt. up inchair upon arrival, agreeable to therapy at this time.   Pain: denies at this time, only when he coughs  Orientation  Overall Orientation Status: Within Normal Limits   Objective   Bed Mobility Training  Bed Mobility Training: No  Overall Level of Assistance: Supervision  Interventions: Demonstration  Rolling: Supervision  Supine to Sit: Supervision  Sit to Supine: Supervision  Scooting: Supervision  Transfer Training  Transfer Training: Yes  Overall Level of Assistance: Supervision  Interventions: Demonstration  Sit to Stand: Supervision  Stand to Sit: Supervision  Stand Pivot Transfers: Supervision  Bed to Chair: Supervision  Gait Training  Gait Training: Yes  Gait  Overall Level of Assistance: Supervision  Interventions: Demonstration  Distance (ft):  ([id number])  Assistive Device: Walker, rolling  PT Exercises  Exercise Treatment: Seated exercises B LE x20  Safety Devices  Type of Devices: Call light within reach; Left in chair;Nurse notified   Goals  Short Term Goals  Time Frame for Short Term Goals: 3 DAYS  Short Term Goal 1: IND TRANSFERS  Short Term Goal 2: IND GAIT NO DEVICES 150 FT. Long Term Goals  Time Frame for Long Term Goals : 2 WEEKS  Long Term Goal 1: IND GAIT 400 FT NO DEVICES.   Patient Goals   Patient Goals : RETURN HOME IND GAIT NO DEVICES  Education  Patient Education  Education Given To: Patient  Education Provided: Role of Therapy;Plan of Care;Home Exercise Program  Education Method: Verbal  Barriers to Learning: None  Education Outcome: Verbalized understanding;Demonstrated understanding  Therapy Time   Individual Concurrent Group Co-treatment   Time In 1300         Time Out 1326         Minutes [address] [place] , PTA

## 2022-12-05 NOTE — PROGRESS NOTES
Pt alert and oriented x4 resting in chair at this time. Vitals and assessment completed as charted. Pt denies any needs at this time. NG tube remains patent at low intermittent suction. Call light is within reach. Will continue to monitor.

## 2022-12-05 NOTE — PROGRESS NOTES
Vitals and assessment done at this time. See flow sheet for more details. Pt resting in bed. Pt denied any shortness of breath, dizziness, and light headedness. Pt did say sometimes he has some tenderness around his incision area. Pt denied any other needs at this time. Call light within reach. Will continue to monitor.

## 2022-12-05 NOTE — PROGRESS NOTES
Comprehensive Nutrition Assessment    Type and Reason for Visit:  Initial, Positive Nutrition Screen (MST 2)    Nutrition Recommendations/Plan:   Continue NPO diet. Malnutrition Assessment:  Malnutrition Status:  Severe malnutrition (12/05/22 1053)    Context:  Acute Illness     Findings of the 6 clinical characteristics of malnutrition:  Energy Intake:  50% or less of estimated energy requirements for 5 or more days  Weight Loss:  Greater than 2% over 1 week (5.1%)     Body Fat Loss:  No significant body fat loss     Muscle Mass Loss:  No significant muscle mass loss    Fluid Accumulation:  No significant fluid accumulation     Strength:  Not Performed    Nutrition Assessment:    Inadequate oral intakes r/t altered GI aeb PO < 50% x 5 days-N/V unable to keep anything down, s/p strangulated hernia repair. 10#/5.1% weight loss x 1 week. Pt does not want ensure clear or ensure when diet advanced \"does not like the taste of them. \" States he is feeling better, wondering \"how I am going to be able to pass gas when I'm not eating anything. \" Reasssurance offered that gas would occur without oral intakes. Last good meal was Wednesday night. Pt is 5 days without substantial nutrition placing him at increased nutritional risk. May need to consider PN if unable to progress diet. Nutrition Related Findings:    no malnutrition indices noted Wound Type: Surgical Incision       Current Nutrition Intake & Therapies:    Average Meal Intake: NPO  Average Supplements Intake: NPO  Diet NPO    Anthropometric Measures:  Height: 6' 2\" (188 cm)  Ideal Body Weight (IBW): 190 lbs (86 kg)    Admission Body Weight: 208 lb 8 oz (94.6 kg)  Current Body Weight: 214 lb 3.2 oz (97.2 kg), 112.7 % IBW. Weight Source: Bed Scale  Current BMI (kg/m2): 27.5  Usual Body Weight: 228 lb (103.4 kg)  % Weight Change (Calculated): -6.1  Weight Adjustment For: No Adjustment                 BMI Categories: Overweight (BMI 25.0-29. 9)    Estimated

## 2022-12-05 NOTE — PROGRESS NOTES
Vitals and assessment completed at this time as charted. Patient sitting up in chair with no complaints at this time. Patient slowly eating ice chips and tolerating well so far. Bowel sounds active but hypoactive in RLQ. Patient denies any needs. Call light remains within reach.

## 2022-12-05 NOTE — PROGRESS NOTES
Physical Therapy  Facility/Department: [facility] AT THE [facility] MED SURG  Daily Treatment Note  NAME: Tarun Karimi  : 1955  MRN: [id number]  Date of Service: 2022  Discharge Recommendations:  Home independently   Patient Diagnosis(es): The primary encounter diagnosis was Small bowel obstruction due to right inguinal hernia. A diagnosis of Partial intestinal obstruction, unspecified cause ([address] was also pertinent to this visit. Assessment   Assessment: Gait [id number] with WW,SUP. Transfers/Bed mobility:SUP. Supine and seated exercises B Le x20  Activity Tolerance: Patient tolerated treatment well   Plan    Physcial Therapy Plan  General Plan: 2 times a day 7 days a week  Current Treatment Recommendations: Strengthening;ROM;Balance training;Functional mobility training;Transfer training;ADL/Self-care training;Gait training;Neuromuscular re-education; Safety education & training;Patient/Caregiver education & training   Restrictions  Restrictions/Precautions  Restrictions/Precautions: NPO  Required Braces or Orthoses?: No   Subjective    Subjective  Subjective: Pt. in bed upon arrival with family present, agreeable to therapy at this time.   Pain: denies at this time, only when he coughs  Orientation  Overall Orientation Status: Within Normal Limits   Objective   Bed Mobility Training  Bed Mobility Training: Yes  Overall Level of Assistance: Supervision  Interventions: Demonstration  Rolling: Supervision  Supine to Sit: Supervision  Sit to Supine: Supervision  Scooting: Supervision  Transfer Training  Transfer Training: Yes  Overall Level of Assistance: Supervision  Interventions: Demonstration  Sit to Stand: Supervision  Stand to Sit: Supervision  Stand Pivot Transfers: Supervision  Gait Training  Gait Training: Yes  Gait  Overall Level of Assistance: Supervision  Interventions: Demonstration  Distance (ft):  (300ft x1)  Assistive Device: Walker, rolling  PT Exercises  Exercise Treatment: Supine and seated exercises B LE x 20  Safety Devices  Type of Devices: Call light within reach; Left in chair;Nurse notified   Goals  Short Term Goals  Time Frame for Short Term Goals: 3 DAYS  Short Term Goal 1: IND TRANSFERS  Short Term Goal 2: IND GAIT NO DEVICES 150 FT. Long Term Goals  Time Frame for Long Term Goals : 2 WEEKS  Long Term Goal 1: IND GAIT 400 FT NO DEVICES.   Patient Goals   Patient Goals : RETURN HOME IND GAIT NO DEVICES    Education  Patient Education  Education Given To: Patient  Education Provided: Transfer Training  Education Method: Verbal  Barriers to Learning: None  Education Outcome: Verbalized understanding;Demonstrated understanding  Therapy Time   Individual Concurrent Group Co-treatment   Time In 0831         Time Out 0900         Minutes [facility]

## 2022-12-05 NOTE — PROGRESS NOTES
Vitals and reassessment done at this time. See flow sheet for more details. Pt resting in chair at this time. Pt denied any pain at this time. Pt stated he was having some tenderness around the incision site when walking this afternoon. Dressing clean, dry and intact. Pt lungs clear, denied any shortness of breath. Pt stated he did not have any other needs at this time. Call light within reach. Will continue to monitor.

## 2022-12-05 NOTE — PROGRESS NOTES
I was given a flash drive from the patient's family for a copy of his Advanced Directives. I tell the patient that I am not able to use the flash drive for security reasons. The patient is understanding and tells me that he will have his family print it out. I look on the patient's chart and notice that he already has a copy of his [address] and Living Will on his chart. I read through them and he verifies they are accurate and reflect his wishes. I have the ACP activator conversation with the patient and document his answers. Pt tells me that he wishes to be a full code. He tells me that he has made his wishes know to his family.     [address] Lesly Rangel Nurse Coordinator  12/5/2022 11:17 AM

## 2022-12-05 NOTE — ACP (ADVANCE CARE PLANNING)
Advance Care Planning     Advance Care Planning Activator (Inpatient)  Conversation Note      Date of ACP Conversation: 12/5/2022     Conversation Conducted with: Patient with Decision Making Capacity    ACP Activator: Ella Batista RN        Health Care Decision Maker:     Current Designated Health Care Decision Maker:     Primary Decision Maker: Batsheva Mcdaniel [facility] - [phone]        Care Preferences    Ventilation: \"If you were in your present state of health and suddenly became very ill and were unable to breathe on your own, what would your preference be about the use of a ventilator (breathing machine) if it were available to you? \"      Would the patient desire the use of ventilator (breathing machine)?: yes    \"If your health worsens and it becomes clear that your chance of recovery is unlikely, what would your preference be about the use of a ventilator (breathing machine) if it were available to you? \"     Would the patient desire the use of ventilator (breathing machine)?: No      Resuscitation  \"CPR works best to restart the heart when there is a sudden event, like a heart attack, in someone who is otherwise healthy. Unfortunately, CPR does not typically restart the heart for people who have serious health conditions or who are very sick. \"    \"In the event your heart stopped as a result of an underlying serious health condition, would you want attempts to be made to restart your heart (answer \"yes\" for attempt to resuscitate) or would you prefer a natural death (answer \"no\" for do not attempt to resuscitate)? \" yes       [x] Yes   [] No   Educated Patient / Bret Wang regarding differences between Advance Directives and portable DNR orders.     Length of ACP Conversation in minutes:  20    Conversation Outcomes:  [x] ACP discussion completed  [x] Existing advance directive reviewed with patient; no changes to patient's previously recorded wishes  [] New Advance Directive completed  [] Portable Do Not Rescitate prepared for Provider review and signature  [] POLST/POST/MOLST/MOST prepared for Provider review and signature      Follow-up plan:    [] Schedule follow-up conversation to continue planning  [x] Referred individual to Provider for additional questions/concerns   [] Advised patient/agent/surrogate to review completed ACP document and update if needed with changes in condition, patient preferences or care setting    [x] This note routed to one or more involved healthcare providers  [address] [place] [address] and [place] Nurse Coordinator  12/5/2022 11:12 AM

## 2022-12-05 NOTE — PROGRESS NOTES
Case Management Assessment  Initial Evaluation    Date/Time of Evaluation: 12/5/2022 10:55 AM  Assessment Completed by: GERA James    If patient is discharged prior to next notation, then this note serves as note for discharge by case management. Patient Name: Flakita Fritz                   YOB: 1955  Diagnosis: Small bowel obstruction ([facility] [K56.609]  Bowel obstruction ([facility] [address] [K56.609]                   Date / Time: 12/3/2022  9:59 AM    Patient Admission Status: Inpatient   Readmission Risk (Low < 19, Mod (19-27), High > 27): Readmission Risk Score: 8.8    Current PCP: Erik Odell MD  PCP verified by CM? Yes    Chart Reviewed: No      History Provided by: Patient  Patient Orientation: Alert and Oriented, Person, Place, Situation, Self    Patient Cognition: Alert    Hospitalization in the last 30 days (Readmission):  No    If yes, Readmission Assessment in  Navigator will be completed. Advance Directives:      Code Status: Full Code   Patient's Primary Decision Maker is: Named in [address] [place] [address]    Primary Decision MakerMarion Gilberto Spouse - [phone]    Discharge Planning:    Patient lives with: Spouse/Significant Other Type of Home: House  Primary Care Giver: Self  Patient Support Systems include: Spouse/Significant Other, Family Members   Current Financial resources: Medicare, [facility] ([place])  Current community resources: Other (Comment) (na)  Current services prior to admission: None            Current DME: C-pap             Type of Home Care services:  None    ADLS  Prior functional level: Independent in ADLs/IADLs  Current functional level: Independent in ADLs/IADLs    PT AM-PAC:   /24  OT AM-PAC: 23 /24    Family can provide assistance at DC: Yes  Would you like Case Management to discuss the discharge plan with any other family members/significant others, and if so, who?  Yes  Plans to Return to Present Housing: Yes  Other Identified Issues/Barriers to RETURNING to current housing: none  Potential Assistance needed at discharge: N/A            Potential DME:    Patient expects to discharge to: [facility] for transportation at discharge: [address]: [address] / Plan: MEDICARE PART A AND B / Product Type: *No Product type* /     Does insurance require precert for SNF: No    Potential assistance Purchasing Medications: No  Meds-to-Beds request:        Tamara  [id number] - [place], [place] - [facility]  [place]  [facility] [place] [address]  Phone: [phone] Fax: [phone]      Notes:    Factors facilitating achievement of predicted outcomes: Family support, Motivated, Cooperative, and Pleasant    Barriers to discharge: none    Additional Case Management Notes: Patient served in the [employer] and does receive services at the [place]. Both him and His wife share in the cooking and the cleaning. Patient manages his own medications    The Plan for Transition of Care is related to the following treatment goals of Small bowel obstruction ([address] [K56.609]  Bowel obstruction ([address] [I75.436]    IF APPLICABLE: The Patient and/or patient representative Chloe Coppola and his family were provided with a choice of provider and agrees with the discharge plan.  Freedom of choice list with basic dialogue that supports the patient's individualized plan of care/goals and shares the quality data associated with the providers was provided to:     Patient Representative Name:       The Patient and/or Patient Representative Agree with the Discharge Plan? yes     Anaya Dasilva [facility]  Case Management Department  Ph: [phone]  Fax: [phone]

## 2022-12-06 LAB
ABSOLUTE EOS #: 0.33 K/UL (ref 0–0.44)
ABSOLUTE IMMATURE GRANULOCYTE: 0.04 K/UL (ref 0–0.3)
ABSOLUTE LYMPH #: 1.19 K/UL (ref 1.1–3.7)
ABSOLUTE MONO #: 0.68 K/UL (ref 0.1–1.2)
ALBUMIN SERPL-MCNC: 3.7 G/DL (ref 3.5–5.2)
ALBUMIN/GLOBULIN RATIO: 1.1 (ref 1–2.5)
ALP BLD-CCNC: 93 U/L (ref 40–129)
ALT SERPL-CCNC: 20 U/L (ref 5–41)
ANION GAP SERPL CALCULATED.3IONS-SCNC: 10 MMOL/L (ref 9–17)
AST SERPL-CCNC: 25 U/L
BASOPHILS # BLD: 1 % (ref 0–2)
BASOPHILS ABSOLUTE: 0.05 K/UL (ref 0–0.2)
BILIRUB SERPL-MCNC: 1.2 MG/DL (ref 0.3–1.2)
BUN BLDV-MCNC: 12 MG/DL (ref 8–23)
BUN/CREAT BLD: 13 (ref 9–20)
CALCIUM SERPL-MCNC: 9.1 MG/DL (ref 8.6–10.4)
CHLORIDE BLD-SCNC: 101 MMOL/L (ref 98–107)
CO2: 26 MMOL/L (ref 20–31)
CREAT SERPL-MCNC: 0.89 MG/DL (ref 0.7–1.2)
EOSINOPHILS RELATIVE PERCENT: 4 % (ref 1–4)
GFR SERPL CREATININE-BSD FRML MDRD: >60 ML/MIN/1.73M2
GLUCOSE BLD-MCNC: 111 MG/DL (ref 70–99)
HCT VFR BLD CALC: 39.8 % (ref 40.7–50.3)
HEMOGLOBIN: 13.1 G/DL (ref 13–17)
IMMATURE GRANULOCYTES: 0 %
INR BLD: 1.5
LYMPHOCYTES # BLD: 13 % (ref 24–43)
MCH RBC QN AUTO: 31.1 PG (ref 25.2–33.5)
MCHC RBC AUTO-ENTMCNC: 32.9 G/DL (ref 28.4–34.8)
MCV RBC AUTO: 94.5 FL (ref 82.6–102.9)
MONOCYTES # BLD: 8 % (ref 3–12)
NRBC AUTOMATED: 0 PER 100 WBC
PDW BLD-RTO: 13.2 % (ref 11.8–14.4)
PLATELET # BLD: 224 K/UL (ref 138–453)
PMV BLD AUTO: 10.9 FL (ref 8.1–13.5)
POTASSIUM SERPL-SCNC: 4.4 MMOL/L (ref 3.7–5.3)
PROTHROMBIN TIME: 18.3 SEC (ref 11.5–14.2)
RBC # BLD: 4.21 M/UL (ref 4.21–5.77)
SEG NEUTROPHILS: 74 % (ref 36–65)
SEGMENTED NEUTROPHILS ABSOLUTE COUNT: 6.6 K/UL (ref 1.5–8.1)
SODIUM BLD-SCNC: 137 MMOL/L (ref 135–144)
SURGICAL PATHOLOGY REPORT: NORMAL
TOTAL PROTEIN: 7 G/DL (ref 6.4–8.3)
WBC # BLD: 8.9 K/UL (ref 3.5–11.3)

## 2022-12-06 PROCEDURE — A4216 STERILE WATER/SALINE, 10 ML: HCPCS | Performed by: NURSE PRACTITIONER

## 2022-12-06 PROCEDURE — 2500000003 HC RX 250 WO HCPCS: Performed by: SURGERY

## 2022-12-06 PROCEDURE — 94761 N-INVAS EAR/PLS OXIMETRY MLT: CPT

## 2022-12-06 PROCEDURE — 97110 THERAPEUTIC EXERCISES: CPT

## 2022-12-06 PROCEDURE — 6370000000 HC RX 637 (ALT 250 FOR IP): Performed by: NURSE PRACTITIONER

## 2022-12-06 PROCEDURE — 6360000002 HC RX W HCPCS: Performed by: SPECIALIST

## 2022-12-06 PROCEDURE — 2580000003 HC RX 258: Performed by: SPECIALIST

## 2022-12-06 PROCEDURE — 36415 COLL VENOUS BLD VENIPUNCTURE: CPT

## 2022-12-06 PROCEDURE — 80053 COMPREHEN METABOLIC PANEL: CPT

## 2022-12-06 PROCEDURE — 85610 PROTHROMBIN TIME: CPT

## 2022-12-06 PROCEDURE — 6360000002 HC RX W HCPCS: Performed by: INTERNAL MEDICINE

## 2022-12-06 PROCEDURE — 85025 COMPLETE CBC W/AUTO DIFF WBC: CPT

## 2022-12-06 PROCEDURE — 97116 GAIT TRAINING THERAPY: CPT

## 2022-12-06 PROCEDURE — 1200000000 HC SEMI PRIVATE

## 2022-12-06 PROCEDURE — 2580000003 HC RX 258: Performed by: NURSE PRACTITIONER

## 2022-12-06 PROCEDURE — 2500000003 HC RX 250 WO HCPCS: Performed by: NURSE PRACTITIONER

## 2022-12-06 RX ORDER — WARFARIN SODIUM 5 MG/1
10 TABLET ORAL
Status: COMPLETED | OUTPATIENT
Start: 2022-12-06 | End: 2022-12-06

## 2022-12-06 RX ADMIN — ENOXAPARIN SODIUM 100 MG: 100 INJECTION SUBCUTANEOUS at 08:54

## 2022-12-06 RX ADMIN — ENOXAPARIN SODIUM 100 MG: 100 INJECTION SUBCUTANEOUS at 20:49

## 2022-12-06 RX ADMIN — PIPERACILLIN AND TAZOBACTAM 3375 MG: 3; .375 INJECTION, POWDER, FOR SOLUTION INTRAVENOUS at 09:04

## 2022-12-06 RX ADMIN — WARFARIN SODIUM 10 MG: 5 TABLET ORAL at 17:21

## 2022-12-06 RX ADMIN — FAMOTIDINE 20 MG: 10 INJECTION INTRAVENOUS at 08:53

## 2022-12-06 RX ADMIN — DEXTROSE, SODIUM CHLORIDE, AND POTASSIUM CHLORIDE: 5; .45; .15 INJECTION INTRAVENOUS at 13:08

## 2022-12-06 RX ADMIN — PIPERACILLIN AND TAZOBACTAM 3375 MG: 3; .375 INJECTION, POWDER, FOR SOLUTION INTRAVENOUS at 00:03

## 2022-12-06 RX ADMIN — FAMOTIDINE 20 MG: 10 INJECTION INTRAVENOUS at 20:49

## 2022-12-06 RX ADMIN — PIPERACILLIN AND TAZOBACTAM 3375 MG: 3; .375 INJECTION, POWDER, FOR SOLUTION INTRAVENOUS at 17:23

## 2022-12-06 ASSESSMENT — PAIN SCALES - GENERAL
PAINLEVEL_OUTOF10: 0
PAINLEVEL_OUTOF10: 0

## 2022-12-06 NOTE — PLAN OF CARE
Problem: Chronic Conditions and Co-morbidities  Goal: Patient's chronic conditions and co-morbidity symptoms are monitored and maintained or improved  12/6/2022 0942 by Fabiana Brantley RN  Outcome: Progressing  Flowsheets (Taken 12/6/2022 0745)  Care Plan - Patient's Chronic Conditions and Co-Morbidity Symptoms are Monitored and Maintained or Improved: Monitor and assess patient's chronic conditions and comorbid symptoms for stability, deterioration, or improvement  12/6/2022 0124 by Addi Jenkins RN  Outcome: Progressing  Flowsheets (Taken 12/6/2022 0124)  Care Plan - Patient's Chronic Conditions and Co-Morbidity Symptoms are Monitored and Maintained or Improved: Monitor and assess patient's chronic conditions and comorbid symptoms for stability, deterioration, or improvement     Problem: Discharge Planning  Goal: Discharge to home or other facility with appropriate resources  12/6/2022 0942 by Fabiana Brantley RN  Outcome: Progressing  Flowsheets (Taken 12/6/2022 0745)  Discharge to home or other facility with appropriate resources: Identify barriers to discharge with patient and caregiver  12/6/2022 0124 by Addi Jenkins RN  Outcome: Progressing  Flowsheets (Taken 12/6/2022 0124)  Discharge to home or other facility with appropriate resources:   Identify barriers to discharge with patient and caregiver   Arrange for needed discharge resources and transportation as appropriate   Identify discharge learning needs (meds, wound care, etc)     Problem: Pain  Goal: Verbalizes/displays adequate comfort level or baseline comfort level  12/6/2022 0942 by Fabiana Brantley RN  Outcome: Progressing  Flowsheets (Taken 12/6/2022 0745)  Verbalizes/displays adequate comfort level or baseline comfort level: Encourage patient to monitor pain and request assistance  12/6/2022 0124 by Addi Jenkins RN  Outcome: Progressing  Flowsheets (Taken 12/6/2022 0124)  Verbalizes/displays adequate comfort level or baseline comfort level:   Encourage patient to monitor pain and request assistance   Assess pain using appropriate pain scale   Administer analgesics based on type and severity of pain and evaluate response     Problem: Safety - Adult  Goal: Free from fall injury  12/6/2022 0942 by Earl Alejandra RN  Outcome: Progressing  Flowsheets (Taken 12/6/2022 0942)  Free From Fall Injury: Instruct family/caregiver on patient safety  12/6/2022 0124 by Anton Holman RN  Outcome: Progressing  Flowsheets (Taken 12/6/2022 0124)  Free From Fall Injury: Instruct family/caregiver on patient safety     Problem: Skin/Tissue Integrity  Goal: Absence of new skin breakdown  Description: 1. Monitor for areas of redness and/or skin breakdown  2. Assess vascular access sites hourly  3. Every 4-6 hours minimum:  Change oxygen saturation probe site  4. Every 4-6 hours:  If on nasal continuous positive airway pressure, respiratory therapy assess nares and determine need for appliance change or resting period.   12/6/2022 0942 by Earl Alejandra RN  Outcome: Progressing  12/6/2022 0124 by Anton Holman RN  Outcome: Progressing     Problem: Gastrointestinal - Adult  Goal: Minimal or absence of nausea and vomiting  12/6/2022 0942 by Earl Alejandra RN  Outcome: Progressing  Flowsheets (Taken 12/6/2022 0745)  Minimal or absence of nausea and vomiting: Administer IV fluids as ordered to ensure adequate hydration  12/6/2022 0124 by Anton Holman RN  Outcome: Progressing  Flowsheets (Taken 12/6/2022 0124)  Minimal or absence of nausea and vomiting:   Administer IV fluids as ordered to ensure adequate hydration   Maintain NPO status until nausea and vomiting are resolved   Administer ordered antiemetic medications as needed   Advance diet as tolerated, if ordered  Goal: Maintains or returns to baseline bowel function  12/6/2022 0942 by Earl Alejandra RN  Outcome: Progressing  Flowsheets (Taken 12/6/2022 0745)  Maintains or returns to baseline bowel function: Assess bowel function  12/6/2022 0124 by Samson Maddox RN  Outcome: Progressing     Problem: Nutrition Deficit:  Goal: Optimize nutritional status  12/6/2022 0942 by Lorena Dover RN  Outcome: Progressing  12/6/2022 0124 by Samson Maddox RN  Outcome: Progressing

## 2022-12-06 NOTE — PROGRESS NOTES
Occupational Therapy  Facility/Department: [facility] AT THE [facility] MED SURG  Daily Treatment Note  NAME: Ashley Watts  : 1955  MRN: [id number]    Date of Service: 2022    Discharge Recommendations:  Continue to assess pending progress, Home with assist PRN         Patient Diagnosis(es): The primary encounter diagnosis was Small bowel obstruction due to right inguinal hernia. A diagnosis of Partial intestinal obstruction, unspecified cause ([address] was also pertinent to this visit. Assessment    Activity Tolerance: Patient tolerated treatment well  Discharge Recommendations: Continue to assess pending progress;Home with assist PRN      Plan   Occupational Therapy Plan  Times Per Day: Once a day  Days Per Week: 7 Days  Current Treatment Recommendations: Strengthening; Functional mobility training; Endurance training; Safety education & training;Patient/Caregiver education & training;Equipment evaluation, education, & procurement;Self-Care / ADL; Home management training     Restrictions  Restrictions/Precautions  Restrictions/Precautions: NPO    Subjective   Subjective  Subjective: Pt sitting up EOB upon arrival. Pt agreed to participate in therapy session. Pt declined self care. Pain: Pt reported no pain at this time. Orientation  Overall Orientation Status: Within Normal Limits           Objective    Vitals       OT Exercises  Exercise Treatment: Pt tolerated BUE ther ex with 2# dumbbell x 7 planes x 15 reps x 1 set, yellow flex bar x 3 variations x 15 reps x 1 set to increase UE strength and endurance in order to ease completion of ADL tasks. RBs as needed secondary to fatigue. Safety Devices  Type of Devices: Call light within reach; Left in bed     Patient Education  Education Given To: Patient  Education Provided: Role of Therapy;Plan of Care  Education Method: Verbal  Barriers to Learning: None  Education Outcome: Verbalized understanding    Goals  Short Term Goals  Time Frame for Short Term Goals: 24 visits  Short Term Goal 1: Patient to be educated on AE/DME and d/c folder to ensure safe and indep return home. Short Term Goal 2: Patient to engage in 15 minutes of standing during ther ex/ther act and/or fxl task of choce to improve standing kendall/bal and safety during ADL. Short Term Goal 3: Patient to complete ADL routine c mod I c use of AE as needed to ensure safe return home.        Therapy Time   Individual Concurrent Group Co-treatment   Time In 1030         Time Out 1053         Minutes 23                 MARIAM Stafford/APARNA

## 2022-12-06 NOTE — PLAN OF CARE
Problem: Chronic Conditions and Co-morbidities  Goal: Patient's chronic conditions and co-morbidity symptoms are monitored and maintained or improved  Outcome: Progressing  Flowsheets (Taken 12/6/2022 0124)  Care Plan - Patient's Chronic Conditions and Co-Morbidity Symptoms are Monitored and Maintained or Improved: Monitor and assess patient's chronic conditions and comorbid symptoms for stability, deterioration, or improvement     Problem: Discharge Planning  Goal: Discharge to home or other facility with appropriate resources  Outcome: Progressing  Flowsheets (Taken 12/6/2022 0124)  Discharge to home or other facility with appropriate resources:   Identify barriers to discharge with patient and caregiver   Arrange for needed discharge resources and transportation as appropriate   Identify discharge learning needs (meds, wound care, etc)     Problem: Pain  Goal: Verbalizes/displays adequate comfort level or baseline comfort level  Outcome: Progressing  Flowsheets (Taken 12/6/2022 0124)  Verbalizes/displays adequate comfort level or baseline comfort level:   Encourage patient to monitor pain and request assistance   Assess pain using appropriate pain scale   Administer analgesics based on type and severity of pain and evaluate response     Problem: Safety - Adult  Goal: Free from fall injury  Outcome: Progressing  Flowsheets (Taken 12/6/2022 0124)  Free From Fall Injury: Instruct family/caregiver on patient safety     Problem: Skin/Tissue Integrity  Goal: Absence of new skin breakdown  Description: 1. Monitor for areas of redness and/or skin breakdown  2. Assess vascular access sites hourly  3. Every 4-6 hours minimum:  Change oxygen saturation probe site  4. Every 4-6 hours:  If on nasal continuous positive airway pressure, respiratory therapy assess nares and determine need for appliance change or resting period.   Outcome: Progressing     Problem: Gastrointestinal - Adult  Goal: Minimal or absence of nausea and vomiting  Outcome: Progressing  Flowsheets (Taken 12/6/2022 0124)  Minimal or absence of nausea and vomiting:   Administer IV fluids as ordered to ensure adequate hydration   Maintain NPO status until nausea and vomiting are resolved   Administer ordered antiemetic medications as needed   Advance diet as tolerated, if ordered  Goal: Maintains or returns to baseline bowel function  Outcome: Progressing     Problem: Nutrition Deficit:  Goal: Optimize nutritional status  Outcome: Progressing

## 2022-12-06 NOTE — PROGRESS NOTES
Pt sitting on side of bed, assessment and vitals complete as charted, patient states he is not having pain, only when coughing, patient noted to be using pillow to splint when coughing, noted to have dressing to mid abdomen, some old drainage noted to dressing, noted to have active bowel sounds, states he is starting to feel hungry, all needs met, call light within reach.

## 2022-12-06 NOTE — CONSULTS
[facility]       Patient:  Anne Villalobos  MRN: [id number]    Warfarin consult follow-up:    INR (no units)   Date Value   12/06/2022 1.5   12/04/2022 1.9   12/03/2022 1.8   12/23/2020 2.6   06/04/2020 2.1       Hemoglobin (g/dL)   Date Value   12/06/2022 13.1   12/04/2022 13.0   12/03/2022 16.7     Hematocrit (%)   Date Value   12/06/2022 39.8 (L)   12/04/2022 38.7 (L)   12/03/2022 49.5     Platelets (k/uL)   Date Value   12/06/2022 224   12/04/2022 202   12/03/2022 255       Target INR Range: 2.0-3.0    Significant Drug-Drug Interactions:  New warfarin drug-drug interactions: continues zosyn, continue lovenox  Discontinued drug-drug interactions: no change      Notes:  Patient to restart warfarin 10mg po today. Continue lovenox  100mg SQ BID at this time. Patient's CHAADS score is 1 based on available information. Daily PT/INR until stable within therapeutic range.        Tone Nichols [facility] [place],   12/6/2022, 9:54 AM

## 2022-12-06 NOTE — PROGRESS NOTES
Progress Note    SUBJECTIVE:    Patient seen for f/u of Bowel obstruction ([address] He resting in bed pain better controlled. No complaints. Passing flatus . NG out    ROS:   Constitutional: negative  for fevers, and negative for chills. Respiratory: negative for shortness of breath, negative for cough, and negative for wheezing  Cardiovascular: negative for chest pain, and negative for palpitations  Gastrointestinal: positive for abdominal pain, negative for nausea,negative for vomiting, negative for diarrhea, and negative for constipation     All other systems were reviewed with the patient and are negative unless otherwise stated in HPI      OBJECTIVE:      Vitals:   Vitals:    12/06/22 0000   BP: 126/79   Pulse: 86   Resp: 18   Temp: 98 °F (36.7 °C)   SpO2: 95%     Weight: 214 lb 6.4 oz (97.3 kg)   Height: 6' 2\" (188 cm)     Weight  Wt Readings from Last 3 Encounters:   12/06/22 214 lb 6.4 oz (97.3 kg)   09/26/22 225 lb (102.1 kg)   09/19/22 226 lb (102.5 kg)     Body mass index is 27.53 kg/m². 24HR INTAKE/OUTPUT:      Intake/Output Summary (Last 24 hours) at 12/6/2022 0731  Last data filed at 12/6/2022 0457  Gross per 24 hour   Intake 1623.47 ml   Output 1600 ml   Net 23.47 ml     -----------------------------------------------------------------  Exam:    GEN:    Awake, alert and oriented x3. EYES:  EOMI, pupils equal   NECK: Supple. No lymphadenopathy. No carotid bruit  CVS:    irregularly irregular, no audible murmur  PULM:  diminished but clear without wheezing, rales or rhonchi, no acute respiratory distress  ABD:    Bowels sounds hypoactive. Abdomen is soft. No distention. no tenderness to palpation. Dressing CDI  EXT:   no edema bilaterally . No calf tenderness. NEURO: Moves all extremities. Motor and sensory are grossly intact  SKIN:  No rashes.   No skin lesions.    -----------------------------------------------------------------    Diagnostic Data:      Complete Blood Count:   Recent Labs 12/03/22  1040 12/04/22  0616 12/06/22  0545   WBC 10.1 10.8 8.9   RBC 5.36 4.20* 4.21   HGB 16.7 13.0 13.1   HCT 49.5 38.7* 39.8*   MCV 92.4 92.1 94.5   MCH 31.2 31.0 31.1   MCHC 33.7 33.6 32.9   RDW 13.4 13.3 13.2    202 224   MPV 10.2 10.9 10.9        Last 3 Blood Glucose:   Recent Labs     12/03/22  1040 12/04/22  0616 12/06/22  0545   GLUCOSE 140* 225* 111*        Comprehensive Metabolic Profile:   Recent Labs     12/03/22  1040 12/04/22  0616 12/06/22  0545    138 137   K 4.3 4.6 4.4   CL 91* 99 101   CO2 34* 28 26   BUN 23 22 12   CREATININE 1.29* 1.00 0.89   GLUCOSE 140* 225* 111*   CALCIUM 10.4 8.9 9.1   PROT 8.4* 6.4 7.0   LABALBU 4.8 3.9 3.7   BILITOT 1.1 1.0 1.2   ALKPHOS 143* 99 93   AST 29 30 25   ALT 26 22 20        Urinalysis:   Lab Results   Component Value Date/Time    NITRU NEGATIVE 12/03/2022 01:39 PM    COLORU Yellow 12/03/2022 01:39 PM    PHUR 5.5 12/03/2022 01:39 PM    WBCUA 0 TO 2 12/03/2022 01:39 PM    RBCUA None 12/03/2022 01:39 PM    MUCUS 3+ 12/03/2022 01:39 PM    TRICHOMONAS NOT REPORTED 06/05/2020 01:45 AM    YEAST NOT REPORTED 06/05/2020 01:45 AM    BACTERIA 1+ 12/03/2022 01:39 PM    SPECGRAV >1.030 12/03/2022 01:39 PM    LEUKOCYTESUR NEGATIVE 12/03/2022 01:39 PM    UROBILINOGEN Normal 12/03/2022 01:39 PM    BILIRUBINUR MODERATE 12/03/2022 01:39 PM    GLUCOSEU NEGATIVE 12/03/2022 01:39 PM    KETUA 1+ 12/03/2022 01:39 PM    AMORPHOUS 2+ 12/03/2022 01:39 PM       HgBA1c:  No results found for: LABA1C    Lactic Acid:   Lab Results   Component Value Date/Time    LACTA 1.6 12/03/2022 10:40 AM    LACTA 1.7 12/23/2020 08:55 PM    LACTA 2.1 06/05/2020 12:05 AM        Radiology/Imaging:  XR ABDOMEN (2 VIEWS)   Final Result   Nonspecific, nonobstructive bowel gas pattern. XR ABDOMEN FOR NG/OG/NE TUBE PLACEMENT   Final Result   NG tube placement as above. Advancement by 10 cm recommended.          CT ABDOMEN PELVIS W IV CONTRAST Additional Contrast? None   Final Result Small bowel containing indirect right inguinal hernia with complete bowel   obstruction.                ASSESSMENT / PLAN:  Bowel obstruction ([address]  Continue current therapy   Appreciate general surgery  Postop day #2  Continue IV Zosyn  NG out  ambulate  IV fluids  PAF  Therapeutic Lovenox will restart Coumadin - will bridge if needed  Hold Coumadin  Nutrition status:   obesity, non-morbid  Dietician consult initiated  Hospital Prophylaxis:   DVT: Lovenox / Coumadin  Stress Ulcer: H2 Blocker   High risk medications: none   Disposition:    Discharge plan is home      Brenda Arnold, [address] [place] [address] , MO[place] NP-C  Hospitalist Medicine        12/6/2022, 7:31 AM

## 2022-12-06 NOTE — PROGRESS NOTES
Patient sitting in chair, assessment and vitals complete as charted, patient states he is not having pain, no drainage noted on dressing, noted to have active bowel sounds, all needs met, call light within reach.

## 2022-12-06 NOTE — PROGRESS NOTES
Physical Therapy  Facility/Department: [facility] AT THE [facility] MED SURG  Daily Treatment Note  NAME: Flakita Fritz  : 1955  MRN: [id number]  Date of Service: 2022  Discharge Recommendations:  Home independently   Patient Diagnosis(es): The primary encounter diagnosis was Small bowel obstruction due to right inguinal hernia. A diagnosis of Partial intestinal obstruction, unspecified cause ([address] was also pertinent to this visit. Assessment   Assessment: Transfers:SBA/SUP with slow movement d/t abdominal pain with staples. Gait [id number] with [address], SUP. Seated exercises B LE x20  Activity Tolerance: Patient tolerated treatment well   Plan    Physcial Therapy Plan  General Plan: 2 times a day 7 days a week  Current Treatment Recommendations: Strengthening;ROM;Balance training;Functional mobility training;Transfer training;ADL/Self-care training;Gait training;Neuromuscular re-education; Safety education & training;Patient/Caregiver education & training   Restrictions  Restrictions/Precautions  Restrictions/Precautions: NPO  Required Braces or Orthoses?: No   Subjective    Subjective  Subjective: Pt. up with wife ambulating, agreeable to work with therapy at this time.   Pain: denies at this time, only when he coughs  Orientation  Overall Orientation Status: Within Normal Limits   Objective   Bed Mobility Training  Bed Mobility Training: No  Overall Level of Assistance: Supervision  Supine to Sit: Supervision  Balance  Sitting: Intact  Standing: Intact  Transfer Training  Transfer Training: Yes  Overall Level of Assistance: Supervision  Interventions: Demonstration  Sit to Stand: Supervision  Stand to Sit: Supervision  Stand Pivot Transfers: Supervision  Gait Training  Gait Training: Yes  Gait  Overall Level of Assistance: Supervision  Interventions: Demonstration  Distance (ft):  ([id number])  Assistive Device: Walker, rolling  PT Exercises  Exercise Treatment: Seated exercises B LE x20  Safety Devices  Type of Devices: Call light within reach; Left in bed;Nurse notified   Goals  Short Term Goals  Time Frame for Short Term Goals: 3 DAYS  Short Term Goal 1: IND TRANSFERS  Short Term Goal 2: IND GAIT NO DEVICES 150 FT. Long Term Goals  Time Frame for Long Term Goals : 2 WEEKS  Long Term Goal 1: IND GAIT 400 FT NO DEVICES.   Patient Goals   Patient Goals : RETURN HOME IND GAIT NO DEVICES  Education  Patient Education  Education Given To: Patient  Education Provided: Role of Therapy;Plan of Care;Home Exercise Program  Education Method: Verbal  Barriers to Learning: None  Education Outcome: Verbalized understanding;Demonstrated understanding  Therapy Time   Individual Concurrent Group Co-treatment   Time In 1315         Time Out 1343         Minutes 1755 [facility]

## 2022-12-06 NOTE — PROGRESS NOTES
Physical Therapy  Facility/Department: [facility] AT THE [facility] MED SURG  Daily Treatment Note  NAME: Alexander Palmer  : 1955  MRN: [id number]    Date of Service: 2022    Discharge Recommendations:  Home independently        Patient Diagnosis(es): The primary encounter diagnosis was Small bowel obstruction due to right inguinal hernia. A diagnosis of Partial intestinal obstruction, unspecified cause ([address] was also pertinent to this visit. Assessment   Assessment: Pt is doing well. Slow with transfer in/out of chair due to mild pain. Pt denies pain at rest. Gait with walker for safety 700 feet. No LOB noted. Seated exercise with reps of 20  Activity Tolerance: Patient tolerated treatment well     Plan    Physcial Therapy Plan  General Plan: 2 times a day 7 days a week  Current Treatment Recommendations: Strengthening;ROM;Balance training;Functional mobility training;Transfer training;ADL/Self-care training;Gait training;Neuromuscular re-education; Safety education & training;Patient/Caregiver education & training     Restrictions  Restrictions/Precautions  Restrictions/Precautions: NPO  Required Braces or Orthoses?: No     Subjective    Subjective  Subjective: Pt denies pain at rest. States he has pain when he coughs and when getting in/out of chair  Orientation  Overall Orientation Status: Within Normal Limits     Objective   Vitals     Bed Mobility Training  Overall Level of Assistance: Supervision  Supine to Sit: Supervision  Balance  Sitting: Intact  Standing: Intact  Transfer Training  Sit to Stand: Supervision  Stand to Sit: Supervision  Stand Pivot Transfers: Supervision     PT Exercises  Exercise Treatment: Seated exercises B LE x20     Safety Devices  Type of Devices: Call light within reach; Left in chair;Nurse notified       Goals  Short Term Goals  Time Frame for Short Term Goals: 3 DAYS  Short Term Goal 1: IND TRANSFERS  Short Term Goal 2: IND GAIT NO DEVICES 150 FT.   Long Term Goals  Time Frame for Long Term Goals : 2 WEEKS  Long Term Goal 1: IND GAIT 400 FT NO DEVICES.   Patient Goals   Patient Goals : RETURN HOME IND GAIT NO DEVICES    Education  Patient Education  Education Given To: Patient  Education Provided: Role of Therapy;Plan of Care;Home Exercise Program    Therapy Time   Individual Concurrent Group Co-treatment   Time In 0815         Time Out 0840         Minutes 25         Timed Code Treatment Minutes: 2900 Katie EMMANUEL [id number]

## 2022-12-06 NOTE — PROGRESS NOTES
Dressing noted to be falling off and 4x4's falling out of dressing, dressing changed, old bloody drainage noted on 4x4s, redressed with 4x4s and abd pad at this time, picture taken of wound and placed in chart, call light within reach.

## 2022-12-07 LAB
ABSOLUTE EOS #: 0.49 K/UL (ref 0–0.44)
ABSOLUTE IMMATURE GRANULOCYTE: 0.03 K/UL (ref 0–0.3)
ABSOLUTE LYMPH #: 1.44 K/UL (ref 1.1–3.7)
ABSOLUTE MONO #: 0.59 K/UL (ref 0.1–1.2)
ALBUMIN SERPL-MCNC: 3.7 G/DL (ref 3.5–5.2)
ALBUMIN/GLOBULIN RATIO: 1.2 (ref 1–2.5)
ALP BLD-CCNC: 85 U/L (ref 40–129)
ALT SERPL-CCNC: 33 U/L (ref 5–41)
ANION GAP SERPL CALCULATED.3IONS-SCNC: 9 MMOL/L (ref 9–17)
AST SERPL-CCNC: 33 U/L
BASOPHILS # BLD: 1 % (ref 0–2)
BASOPHILS ABSOLUTE: 0.04 K/UL (ref 0–0.2)
BILIRUB SERPL-MCNC: 1.1 MG/DL (ref 0.3–1.2)
BUN BLDV-MCNC: 10 MG/DL (ref 8–23)
BUN/CREAT BLD: 11 (ref 9–20)
CALCIUM SERPL-MCNC: 9.2 MG/DL (ref 8.6–10.4)
CHLORIDE BLD-SCNC: 104 MMOL/L (ref 98–107)
CO2: 27 MMOL/L (ref 20–31)
CREAT SERPL-MCNC: 0.9 MG/DL (ref 0.7–1.2)
EOSINOPHILS RELATIVE PERCENT: 7 % (ref 1–4)
GFR SERPL CREATININE-BSD FRML MDRD: >60 ML/MIN/1.73M2
GLUCOSE BLD-MCNC: 117 MG/DL (ref 70–99)
HCT VFR BLD CALC: 38.5 % (ref 40.7–50.3)
HEMOGLOBIN: 13.1 G/DL (ref 13–17)
IMMATURE GRANULOCYTES: 0 %
INR BLD: 1.5
LYMPHOCYTES # BLD: 19 % (ref 24–43)
MCH RBC QN AUTO: 31.3 PG (ref 25.2–33.5)
MCHC RBC AUTO-ENTMCNC: 34 G/DL (ref 28.4–34.8)
MCV RBC AUTO: 92.1 FL (ref 82.6–102.9)
MONOCYTES # BLD: 8 % (ref 3–12)
NRBC AUTOMATED: 0 PER 100 WBC
PDW BLD-RTO: 13.1 % (ref 11.8–14.4)
PLATELET # BLD: 247 K/UL (ref 138–453)
PMV BLD AUTO: 10.6 FL (ref 8.1–13.5)
POTASSIUM SERPL-SCNC: 4.4 MMOL/L (ref 3.7–5.3)
PROTHROMBIN TIME: 18.1 SEC (ref 11.5–14.2)
RBC # BLD: 4.18 M/UL (ref 4.21–5.77)
SEG NEUTROPHILS: 65 % (ref 36–65)
SEGMENTED NEUTROPHILS ABSOLUTE COUNT: 4.92 K/UL (ref 1.5–8.1)
SODIUM BLD-SCNC: 140 MMOL/L (ref 135–144)
TOTAL PROTEIN: 6.8 G/DL (ref 6.4–8.3)
WBC # BLD: 7.5 K/UL (ref 3.5–11.3)

## 2022-12-07 PROCEDURE — A4216 STERILE WATER/SALINE, 10 ML: HCPCS | Performed by: NURSE PRACTITIONER

## 2022-12-07 PROCEDURE — 6370000000 HC RX 637 (ALT 250 FOR IP): Performed by: NURSE PRACTITIONER

## 2022-12-07 PROCEDURE — 85025 COMPLETE CBC W/AUTO DIFF WBC: CPT

## 2022-12-07 PROCEDURE — 97110 THERAPEUTIC EXERCISES: CPT

## 2022-12-07 PROCEDURE — 80053 COMPREHEN METABOLIC PANEL: CPT

## 2022-12-07 PROCEDURE — 94761 N-INVAS EAR/PLS OXIMETRY MLT: CPT

## 2022-12-07 PROCEDURE — 2580000003 HC RX 258: Performed by: SPECIALIST

## 2022-12-07 PROCEDURE — 36415 COLL VENOUS BLD VENIPUNCTURE: CPT

## 2022-12-07 PROCEDURE — 6360000002 HC RX W HCPCS: Performed by: INTERNAL MEDICINE

## 2022-12-07 PROCEDURE — 85610 PROTHROMBIN TIME: CPT

## 2022-12-07 PROCEDURE — 2500000003 HC RX 250 WO HCPCS: Performed by: SURGERY

## 2022-12-07 PROCEDURE — 2580000003 HC RX 258: Performed by: NURSE PRACTITIONER

## 2022-12-07 PROCEDURE — 1200000000 HC SEMI PRIVATE

## 2022-12-07 PROCEDURE — 6360000002 HC RX W HCPCS: Performed by: SPECIALIST

## 2022-12-07 PROCEDURE — 6370000000 HC RX 637 (ALT 250 FOR IP): Performed by: INTERNAL MEDICINE

## 2022-12-07 PROCEDURE — 2500000003 HC RX 250 WO HCPCS: Performed by: NURSE PRACTITIONER

## 2022-12-07 PROCEDURE — 97116 GAIT TRAINING THERAPY: CPT

## 2022-12-07 PROCEDURE — 2580000003 HC RX 258: Performed by: INTERNAL MEDICINE

## 2022-12-07 RX ORDER — WARFARIN SODIUM 5 MG/1
10 TABLET ORAL
Status: COMPLETED | OUTPATIENT
Start: 2022-12-07 | End: 2022-12-07

## 2022-12-07 RX ADMIN — SULFASALAZINE 1000 MG: 500 TABLET ORAL at 17:36

## 2022-12-07 RX ADMIN — WARFARIN SODIUM 10 MG: 5 TABLET ORAL at 17:35

## 2022-12-07 RX ADMIN — DEXTROSE, SODIUM CHLORIDE, AND POTASSIUM CHLORIDE: 5; .45; .15 INJECTION INTRAVENOUS at 00:07

## 2022-12-07 RX ADMIN — ENOXAPARIN SODIUM 100 MG: 100 INJECTION SUBCUTANEOUS at 09:57

## 2022-12-07 RX ADMIN — PIPERACILLIN AND TAZOBACTAM 3375 MG: 3; .375 INJECTION, POWDER, FOR SOLUTION INTRAVENOUS at 00:08

## 2022-12-07 RX ADMIN — PIPERACILLIN AND TAZOBACTAM 3375 MG: 3; .375 INJECTION, POWDER, FOR SOLUTION INTRAVENOUS at 09:53

## 2022-12-07 RX ADMIN — FAMOTIDINE 20 MG: 10 INJECTION INTRAVENOUS at 09:53

## 2022-12-07 RX ADMIN — FAMOTIDINE 20 MG: 10 INJECTION INTRAVENOUS at 20:13

## 2022-12-07 RX ADMIN — DEXTROSE, SODIUM CHLORIDE, AND POTASSIUM CHLORIDE: 5; .45; .15 INJECTION INTRAVENOUS at 12:35

## 2022-12-07 RX ADMIN — SODIUM CHLORIDE, PRESERVATIVE FREE 10 ML: 5 INJECTION INTRAVENOUS at 20:13

## 2022-12-07 NOTE — PROGRESS NOTES
Patient is up in chair and visiting with his wife, he states he only has pain when he coughs, vitals and assessment done as documented, not further needs at this time, call light within reach.

## 2022-12-07 NOTE — PROGRESS NOTES
Writer at bedside for shift assessment. Patient sitting on edge of bed watching TV. Vitals obtained and assessment completed, see flowsheet for details. Midline dressing clean dry and intact. pt denies further needs at this time. Call light in reach.

## 2022-12-07 NOTE — PLAN OF CARE
Problem: Chronic Conditions and Co-morbidities  Goal: Patient's chronic conditions and co-morbidity symptoms are monitored and maintained or improved  Outcome: Progressing     Problem: Discharge Planning  Goal: Discharge to home or other facility with appropriate resources  Outcome: Progressing     Problem: Pain  Goal: Verbalizes/displays adequate comfort level or baseline comfort level  Outcome: Progressing  Flowsheets (Taken 12/6/2022 1415 by Zita Juárez RN)  Verbalizes/displays adequate comfort level or baseline comfort level: Encourage patient to monitor pain and request assistance     Problem: Safety - Adult  Goal: Free from fall injury  Outcome: Progressing     Problem: Chronic Conditions and Co-morbidities  Goal: Patient's chronic conditions and co-morbidity symptoms are monitored and maintained or improved  Outcome: Progressing     Problem: Discharge Planning  Goal: Discharge to home or other facility with appropriate resources  Outcome: Progressing     Problem: Pain  Goal: Verbalizes/displays adequate comfort level or baseline comfort level  Outcome: Progressing  Flowsheets (Taken 12/6/2022 1415 by Zita Juárez RN)  Verbalizes/displays adequate comfort level or baseline comfort level: Encourage patient to monitor pain and request assistance     Problem: Safety - Adult  Goal: Free from fall injury  Outcome: Progressing     Problem: Skin/Tissue Integrity  Goal: Absence of new skin breakdown  Description: 1. Monitor for areas of redness and/or skin breakdown  2. Assess vascular access sites hourly  3. Every 4-6 hours minimum:  Change oxygen saturation probe site  4. Every 4-6 hours:  If on nasal continuous positive airway pressure, respiratory therapy assess nares and determine need for appliance change or resting period.   Outcome: Progressing     Problem: Gastrointestinal - Adult  Goal: Minimal or absence of nausea and vomiting  Outcome: Progressing  Goal: Maintains or returns to baseline bowel function  Outcome: Progressing     Problem: Nutrition Deficit:  Goal: Optimize nutritional status  Outcome: Progressing

## 2022-12-07 NOTE — PROGRESS NOTES
Physical Therapy  Facility/Department: [facility] AT THE [facility] MED SURG  Daily Treatment Note  NAME: Jesenia Lucero  : 1955  MRN: [id number]  Date of Service: 2022  Discharge Recommendations:  Home independently   Patient Diagnosis(es): The primary encounter diagnosis was Small bowel obstruction due to right inguinal hernia. A diagnosis of Partial intestinal obstruction, unspecified cause ([address] was also pertinent to this visit. Assessment   Assessment: Gait while pushing IV pole, 750ft, SUP. Transfers:SUP. Seated exercises b EL x20  Activity Tolerance: Patient tolerated treatment well   Plan    Physcial Therapy Plan  General Plan: 2 times a day 7 days a week  Current Treatment Recommendations: Strengthening;ROM;Balance training;Functional mobility training;Transfer training;ADL/Self-care training;Gait training;Neuromuscular re-education; Safety education & training;Patient/Caregiver education & training   Restrictions  Restrictions/Precautions  Restrictions/Precautions: NPO  Required Braces or Orthoses?: No   Subjective    Subjective  Subjective: Pt. in chair upon arrival, stated he is feeling good and agreeable to therapy at this time. Pain: some pulling from staples  Orientation  Overall Orientation Status: Within Normal Limits   Objective   Bed Mobility Training  Bed Mobility Training: No  Balance  Sitting: Intact  Standing: Intact (Pt completed 10 min unsupported dynamic standing with 0 LOB and no concerns)  Transfer Training  Transfer Training: Yes  Overall Level of Assistance: Supervision  Interventions: Verbal cues  Sit to Stand: Supervision  Stand to Sit: Supervision  Gait Training  Gait Training: Yes  Gait  Overall Level of Assistance: Supervision  Interventions: Demonstration  Distance (ft):  (750ft)  Assistive Device: Other (comment) (pushing IV pole)  PT Exercises  Exercise Treatment: Seated exercises B LE x20  Safety Devices  Type of Devices: All fall risk precautions in place; Left in chair;Call light within reach;Nurse notified   Goals  Short Term Goals  Time Frame for Short Term Goals: 3 DAYS  Short Term Goal 1: IND TRANSFERS  Short Term Goal 2: IND GAIT NO DEVICES 150 FT. Long Term Goals  Time Frame for Long Term Goals : 2 WEEKS  Long Term Goal 1: IND GAIT 400 FT NO DEVICES.   Patient Goals   Patient Goals : RETURN HOME IND GAIT NO DEVICES  Education  Patient Education  Education Given To: Patient  Education Provided: Role of Therapy;Plan of Care;Home Exercise Program  Education Method: Verbal  Barriers to Learning: None  Education Outcome: Verbalized understanding;Demonstrated understanding  Therapy Time   Individual Concurrent Group Co-treatment   Time In 0817         Time Out 0845         Minutes 1755 [facility]

## 2022-12-07 NOTE — PROGRESS NOTES
[address] [place] [address] Note-Warfarin Follow-up       Patient:  Margareth Graham  MRN: [id number]    Warfarin consult follow-up:    INR (no units)   Date Value   12/07/2022 1.5   12/06/2022 1.5   12/04/2022 1.9   12/03/2022 1.8   12/23/2020 2.6   06/04/2020 2.1       Hemoglobin (g/dL)   Date Value   12/07/2022 13.1   12/06/2022 13.1   12/04/2022 13.0     Hematocrit (%)   Date Value   12/07/2022 38.5 (L)   12/06/2022 39.8 (L)   12/04/2022 38.7 (L)     Platelets (k/uL)   Date Value   12/07/2022 247   12/06/2022 224   12/04/2022 202       Target INR Range: 2.0-3.0    Significant Drug-Drug Interactions:  New warfarin drug-drug interactions: continues  Discontinued drug-drug interactions: no changes      Notes:      patient to take warfarin 10mg po today. Patient to continue lovenox at this time. Daily PT/INR until stable within therapeutic range.      Thank you,  Silas Bravo RPH,   12/7/2022, 9:11 AM

## 2022-12-07 NOTE — PROGRESS NOTES
Occupational Therapy  Facility/Department: [facility] AT THE [facility] MED SURG  Daily Treatment Note  NAME: Kalin Booth  : 1955  MRN: [id number]    Date of Service: 2022    Discharge Recommendations:  Continue to assess pending progress, Home with assist PRN         Patient Diagnosis(es): The primary encounter diagnosis was Small bowel obstruction due to right inguinal hernia. A diagnosis of Partial intestinal obstruction, unspecified cause ([address] was also pertinent to this visit. Assessment    Activity Tolerance: Patient tolerated treatment well      Plan   Occupational Therapy Plan  Times Per Day: Once a day  Days Per Week: 7 Days  Current Treatment Recommendations: Strengthening; Functional mobility training; Endurance training; Safety education & training;Patient/Caregiver education & training;Equipment evaluation, education, & procurement;Self-Care / ADL; Home management training     Restrictions   none    Subjective   Subjective  Subjective: Pt in chair with wife present, reporting he had already completed ADLs, agreeable to therex. Discussed d/c planning throughout, pt and wife have no concerns. Pain: occ discomfort at staples site, no increase at this time  Orientation  Overall Orientation Status: Within Normal Limits  Pain: some pulling from staples           Objective    Vitals     Bed Mobility Training  Bed Mobility Training: No  Balance  Sitting: Intact  Standing: Intact (Pt completed 10 min unsupported dynamic standing with 0 LOB and no concerns)  Transfer Training  Transfer Training: Yes  Overall Level of Assistance: Supervision  Gait Training  Gait Training: no        OT Exercises  Exercise Treatment: BUE therex to increase strength/endurance for ease of fxl tasks. Green digiflex x 20 yellow flex bar x 20 bends 2# free weight x 20 reps x all planes shoulder, elbow, and  wrist. 0 RBs. Pt edu on HEP with G return. Safety Devices  Type of Devices: All fall risk precautions in place; Left in chair;Call light within reach     Patient Education  Education Given To: Patient  Education Provided: Role of Therapy;Plan of Care;Home Exercise Program;Equipment  Education Provided Comments: \"I have all the AE from when my wife had back surgery\" no questions  Education Method: Demonstration;Verbal  Barriers to Learning: None  Education Outcome: Verbalized understanding;Demonstrated understanding    Goals  Short Term Goals  Time Frame for Short Term Goals: 21 visits  Short Term Goal 1: Patient to be educated on AE/DME and d/c folder to ensure safe and indep return home. Short Term Goal 2: Patient to engage in 15 minutes of standing during ther ex/ther act and/or fxl task of choce to improve standing kendall/bal and safety during ADL. Short Term Goal 3: Patient to complete ADL routine c mod I c use of AE as needed to ensure safe return home.        Therapy Time   Individual Concurrent Group Co-treatment   Time In 0920         Time Out 0944         Minutes [address] [facility] [address]an., FELIPE

## 2022-12-07 NOTE — PROGRESS NOTES
Writer at bedside for second assessment. Patient laying in bed respirations even and unlabored while on room air. Vitals and assessment competed as charted. Patient denies any pain at this time. Medications given, see MAR for details. Urinal emptied. Midline dressing clean, dry, and intact. Patient denies any current needs, call light within reach.

## 2022-12-07 NOTE — PROGRESS NOTES
Physical Therapy  Facility/Department: [facility] AT THE [facility] MED SURG  Daily Treatment Note  NAME: Dmitriy Salter  : 1955  MRN: [id number]  Date of Service: 2022  Discharge Recommendations:  Home independently   Patient Diagnosis(es): The primary encounter diagnosis was Small bowel obstruction due to right inguinal hernia. A diagnosis of Partial intestinal obstruction, unspecified cause ([address] was also pertinent to this visit. Assessment   Assessment: Gait while pushing IV pole, 750ft, SUP/Mod I. Transfers:SUP/Mod I. Seated exercises b EL x20  Activity Tolerance: Patient tolerated treatment well   Plan    Physcial Therapy Plan  General Plan: 2 times a day 7 days a week  Current Treatment Recommendations: Strengthening;ROM;Balance training;Functional mobility training;Transfer training;ADL/Self-care training;Gait training;Neuromuscular re-education; Safety education & training;Patient/Caregiver education & training   Restrictions  Restrictions/Precautions  Restrictions/Precautions: NPO  Required Braces or Orthoses?: No   Subjective    Subjective  Subjective: Pt. sitting in chair with family present, agreeable to therapy at this time. Pain: denies  Orientation  Overall Orientation Status: Within Normal Limits   Objective   Bed Mobility Training  Bed Mobility Training: No  Balance  Sitting: Intact  Standing: Intact (Pt completed 10 min unsupported dynamic standing with 0 LOB and no concerns)  Transfer Training  Transfer Training: Yes  Overall Level of Assistance: Supervision;Modified independent  Interventions: Verbal cues  Sit to Stand: Supervision;Modified independent  Stand to Sit: Supervision;Modified independent  Gait Training  Gait Training: Yes  Gait  Overall Level of Assistance: Supervision;Modified independent  Interventions: Demonstration  Distance (ft):  (750)  Assistive Device: Other (comment) (pushing IV pole)  PT Exercises  Exercise Treatment: Seated exercises B LE x20  Safety Devices  Type of Devices:  All fall risk precautions in place; Left in chair;Call light within reach;Nurse notified   Goals  Short Term Goals  Time Frame for Short Term Goals: 3 DAYS  Short Term Goal 1: IND TRANSFERS  Short Term Goal 2: IND GAIT NO DEVICES 150 FT. Long Term Goals  Time Frame for Long Term Goals : 2 WEEKS  Long Term Goal 1: IND GAIT 400 FT NO DEVICES.   Patient Goals   Patient Goals : RETURN HOME IND GAIT NO DEVICES  Education  Patient Education  Education Given To: Patient  Education Provided: Role of Therapy;Plan of Care;Home Exercise Program  Education Method: Verbal  Barriers to Learning: None  Education Outcome: Verbalized understanding;Demonstrated understanding  Therapy Time   Individual Concurrent Group Co-treatment   Time In 1320         Time Out 1346         Minutes 1303 Melody Grace, PTA

## 2022-12-07 NOTE — PROGRESS NOTES
Patient sitting on side of bed, vitals and assessment complete as charted, no complaints of pain, states only has pain when coughing, no other complaints voiced, call light within reach.

## 2022-12-07 NOTE — PROGRESS NOTES
Progress Note    SUBJECTIVE:    Patient seen for f/u of Bowel obstruction ([address] He resting in bed pain better controlled. No complaints. Passing flatus . ROS:   Constitutional: negative  for fevers, and negative for chills. Respiratory: negative for shortness of breath, negative for cough, and negative for wheezing  Cardiovascular: negative for chest pain, and negative for palpitations  Gastrointestinal: positive for abdominal pain, negative for nausea,negative for vomiting, negative for diarrhea, and negative for constipation     All other systems were reviewed with the patient and are negative unless otherwise stated in HPI      OBJECTIVE:      Vitals:   Vitals:    12/07/22 0009   BP: 132/71   Pulse: 83   Resp: 16   Temp: 97.8 °F (36.6 °C)   SpO2: 93%     Weight: 213 lb 12.8 oz (97 kg)   Height: 6' 2\" (188 cm)     Weight  Wt Readings from Last 3 Encounters:   12/07/22 213 lb 12.8 oz (97 kg)   09/26/22 225 lb (102.1 kg)   09/19/22 226 lb (102.5 kg)     Body mass index is 27.45 kg/m². 24HR INTAKE/OUTPUT:      Intake/Output Summary (Last 24 hours) at 12/7/2022 0718  Last data filed at 12/7/2022 0222  Gross per 24 hour   Intake --   Output 1500 ml   Net -1500 ml     -----------------------------------------------------------------  Exam:    GEN:    Awake, alert and oriented x3. EYES:  EOMI, pupils equal   NECK: Supple. No lymphadenopathy. No carotid bruit  CVS:    irregularly irregular, no audible murmur  PULM:  diminished but clear without wheezing, rales or rhonchi, no acute respiratory distress  ABD:    Bowels sounds hypoactive. Abdomen is soft. No distention. no tenderness to palpation. Dressing CDI  EXT:   no edema bilaterally . No calf tenderness. NEURO: Moves all extremities. Motor and sensory are grossly intact  SKIN:  No rashes.   No skin lesions.    -----------------------------------------------------------------    Diagnostic Data:      Complete Blood Count:   Recent Labs 12/06/22  0545 12/07/22  0555   WBC 8.9 7.5   RBC 4.21 4.18*   HGB 13.1 13.1   HCT 39.8* 38.5*   MCV 94.5 92.1   MCH 31.1 31.3   MCHC 32.9 34.0   RDW 13.2 13.1    247   MPV 10.9 10.6        Last 3 Blood Glucose:   Recent Labs     12/06/22  0545 12/07/22  0555   GLUCOSE 111* 117*        Comprehensive Metabolic Profile:   Recent Labs     12/06/22  0545 12/07/22  0555    140   K 4.4 4.4    104   CO2 26 27   BUN 12 10   CREATININE 0.89 0.90   GLUCOSE 111* 117*   CALCIUM 9.1 9.2   PROT 7.0 6.8   LABALBU 3.7 3.7   BILITOT 1.2 1.1   ALKPHOS 93 85   AST 25 33   ALT 20 33        Urinalysis:   Lab Results   Component Value Date/Time    NITRU NEGATIVE 12/03/2022 01:39 PM    COLORU Yellow 12/03/2022 01:39 PM    PHUR 5.5 12/03/2022 01:39 PM    WBCUA 0 TO 2 12/03/2022 01:39 PM    RBCUA None 12/03/2022 01:39 PM    MUCUS 3+ 12/03/2022 01:39 PM    TRICHOMONAS NOT REPORTED 06/05/2020 01:45 AM    YEAST NOT REPORTED 06/05/2020 01:45 AM    BACTERIA 1+ 12/03/2022 01:39 PM    SPECGRAV >1.030 12/03/2022 01:39 PM    LEUKOCYTESUR NEGATIVE 12/03/2022 01:39 PM    UROBILINOGEN Normal 12/03/2022 01:39 PM    BILIRUBINUR MODERATE 12/03/2022 01:39 PM    GLUCOSEU NEGATIVE 12/03/2022 01:39 PM    KETUA 1+ 12/03/2022 01:39 PM    AMORPHOUS 2+ 12/03/2022 01:39 PM       HgBA1c:  No results found for: LABA1C    Lactic Acid:   Lab Results   Component Value Date/Time    LACTA 1.6 12/03/2022 10:40 AM    LACTA 1.7 12/23/2020 08:55 PM    LACTA 2.1 06/05/2020 12:05 AM        Radiology/Imaging:  XR ABDOMEN (2 VIEWS)   Final Result   Nonspecific, nonobstructive bowel gas pattern. XR ABDOMEN FOR NG/OG/NE TUBE PLACEMENT   Final Result   NG tube placement as above. Advancement by 10 cm recommended. CT ABDOMEN PELVIS W IV CONTRAST Additional Contrast? None   Final Result   Small bowel containing indirect right inguinal hernia with complete bowel   obstruction.                ASSESSMENT / PLAN:  Bowel obstruction ([address]  Continue current therapy   Appreciate general surgery  Postop day #3  Continue IV Zosyn  NG out  ambulate  IV fluids  Adv Diet  PAF  Therapeutic Lovenox will restart Coumadin - will bridge if needed  Hold Coumadin  Nutrition status:   obesity, non-morbid  Dietician consult initiated  Hospital Prophylaxis:   DVT: Lovenox / Coumadin  Stress Ulcer: H2 Blocker   High risk medications: none   Disposition:    Discharge plan is home today pending Surgeon      MODE Thmopson - CNP , MODE, NP-C  Hospitalist Medicine        12/7/2022, 7:18 AM

## 2022-12-08 VITALS
WEIGHT: 213.8 LBS | RESPIRATION RATE: 18 BRPM | DIASTOLIC BLOOD PRESSURE: 83 MMHG | OXYGEN SATURATION: 96 % | HEIGHT: 74 IN | HEART RATE: 84 BPM | TEMPERATURE: 97.1 F | BODY MASS INDEX: 27.44 KG/M2 | SYSTOLIC BLOOD PRESSURE: 142 MMHG

## 2022-12-08 LAB
ABSOLUTE EOS #: 0.56 K/UL (ref 0–0.44)
ABSOLUTE IMMATURE GRANULOCYTE: 0.04 K/UL (ref 0–0.3)
ABSOLUTE LYMPH #: 1.32 K/UL (ref 1.1–3.7)
ABSOLUTE MONO #: 0.66 K/UL (ref 0.1–1.2)
ALBUMIN SERPL-MCNC: 3.7 G/DL (ref 3.5–5.2)
ALBUMIN/GLOBULIN RATIO: 1.2 (ref 1–2.5)
ALP BLD-CCNC: 88 U/L (ref 40–129)
ALT SERPL-CCNC: 49 U/L (ref 5–41)
ANION GAP SERPL CALCULATED.3IONS-SCNC: 7 MMOL/L (ref 9–17)
AST SERPL-CCNC: 40 U/L
BASOPHILS # BLD: 1 % (ref 0–2)
BASOPHILS ABSOLUTE: 0.04 K/UL (ref 0–0.2)
BILIRUB SERPL-MCNC: 0.9 MG/DL (ref 0.3–1.2)
BUN BLDV-MCNC: 10 MG/DL (ref 8–23)
BUN/CREAT BLD: 12 (ref 9–20)
CALCIUM SERPL-MCNC: 9.3 MG/DL (ref 8.6–10.4)
CHLORIDE BLD-SCNC: 104 MMOL/L (ref 98–107)
CO2: 29 MMOL/L (ref 20–31)
CREAT SERPL-MCNC: 0.83 MG/DL (ref 0.7–1.2)
EOSINOPHILS RELATIVE PERCENT: 8 % (ref 1–4)
GFR SERPL CREATININE-BSD FRML MDRD: >60 ML/MIN/1.73M2
GLUCOSE BLD-MCNC: 96 MG/DL (ref 70–99)
HCT VFR BLD CALC: 38.5 % (ref 40.7–50.3)
HEMOGLOBIN: 12.8 G/DL (ref 13–17)
IMMATURE GRANULOCYTES: 1 %
INR BLD: 1.8
LYMPHOCYTES # BLD: 19 % (ref 24–43)
MCH RBC QN AUTO: 30.7 PG (ref 25.2–33.5)
MCHC RBC AUTO-ENTMCNC: 33.2 G/DL (ref 28.4–34.8)
MCV RBC AUTO: 92.3 FL (ref 82.6–102.9)
MONOCYTES # BLD: 10 % (ref 3–12)
NRBC AUTOMATED: 0 PER 100 WBC
PDW BLD-RTO: 13 % (ref 11.8–14.4)
PLATELET # BLD: 263 K/UL (ref 138–453)
PMV BLD AUTO: 10.7 FL (ref 8.1–13.5)
POTASSIUM SERPL-SCNC: 4.7 MMOL/L (ref 3.7–5.3)
PROTHROMBIN TIME: 20.7 SEC (ref 11.5–14.2)
RBC # BLD: 4.17 M/UL (ref 4.21–5.77)
SEG NEUTROPHILS: 61 % (ref 36–65)
SEGMENTED NEUTROPHILS ABSOLUTE COUNT: 4.33 K/UL (ref 1.5–8.1)
SODIUM BLD-SCNC: 140 MMOL/L (ref 135–144)
TOTAL PROTEIN: 6.7 G/DL (ref 6.4–8.3)
WBC # BLD: 7 K/UL (ref 3.5–11.3)

## 2022-12-08 PROCEDURE — 85610 PROTHROMBIN TIME: CPT

## 2022-12-08 PROCEDURE — 80053 COMPREHEN METABOLIC PANEL: CPT

## 2022-12-08 PROCEDURE — 2500000003 HC RX 250 WO HCPCS: Performed by: NURSE PRACTITIONER

## 2022-12-08 PROCEDURE — 6370000000 HC RX 637 (ALT 250 FOR IP): Performed by: INTERNAL MEDICINE

## 2022-12-08 PROCEDURE — A4216 STERILE WATER/SALINE, 10 ML: HCPCS | Performed by: NURSE PRACTITIONER

## 2022-12-08 PROCEDURE — 2580000003 HC RX 258: Performed by: INTERNAL MEDICINE

## 2022-12-08 PROCEDURE — 2580000003 HC RX 258: Performed by: NURSE PRACTITIONER

## 2022-12-08 PROCEDURE — 94761 N-INVAS EAR/PLS OXIMETRY MLT: CPT

## 2022-12-08 PROCEDURE — 97116 GAIT TRAINING THERAPY: CPT

## 2022-12-08 PROCEDURE — 85025 COMPLETE CBC W/AUTO DIFF WBC: CPT

## 2022-12-08 PROCEDURE — 36415 COLL VENOUS BLD VENIPUNCTURE: CPT

## 2022-12-08 PROCEDURE — 97110 THERAPEUTIC EXERCISES: CPT

## 2022-12-08 RX ORDER — WARFARIN SODIUM 7.5 MG/1
7.5 TABLET ORAL
Status: DISCONTINUED | OUTPATIENT
Start: 2022-12-08 | End: 2022-12-08 | Stop reason: HOSPADM

## 2022-12-08 RX ADMIN — Medication 1000 UNITS: at 08:33

## 2022-12-08 RX ADMIN — FAMOTIDINE 20 MG: 10 INJECTION INTRAVENOUS at 08:33

## 2022-12-08 RX ADMIN — SODIUM CHLORIDE, PRESERVATIVE FREE 10 ML: 5 INJECTION INTRAVENOUS at 08:37

## 2022-12-08 RX ADMIN — SULFASALAZINE 1000 MG: 500 TABLET ORAL at 08:33

## 2022-12-08 NOTE — PLAN OF CARE
Problem: Chronic Conditions and Co-morbidities  Goal: Patient's chronic conditions and co-morbidity symptoms are monitored and maintained or improved  12/8/2022 0858 by Melissa Albarran RN  Outcome: Progressing  12/7/2022 2355 by Yusuf Hutchinson RN  Outcome: Progressing  Flowsheets (Taken 12/7/2022 2355)  Care Plan - Patient's Chronic Conditions and Co-Morbidity Symptoms are Monitored and Maintained or Improved: Monitor and assess patient's chronic conditions and comorbid symptoms for stability, deterioration, or improvement     Problem: Discharge Planning  Goal: Discharge to home or other facility with appropriate resources  12/8/2022 0858 by Melissa Albarran RN  Outcome: Progressing  12/7/2022 2355 by Yusuf Hutchinson RN  Outcome: Progressing  Flowsheets (Taken 12/7/2022 2355)  Discharge to home or other facility with appropriate resources:   Identify barriers to discharge with patient and caregiver   Arrange for needed discharge resources and transportation as appropriate   Identify discharge learning needs (meds, wound care, etc)     Problem: Pain  Goal: Verbalizes/displays adequate comfort level or baseline comfort level  12/8/2022 0858 by Melissa Albarran RN  Outcome: Progressing  Flowsheets (Taken 12/8/2022 0645)  Verbalizes/displays adequate comfort level or baseline comfort level: Encourage patient to monitor pain and request assistance  12/7/2022 2355 by Yusuf Hutchinson RN  Outcome: Progressing  Flowsheets (Taken 12/7/2022 2355)  Verbalizes/displays adequate comfort level or baseline comfort level:   Encourage patient to monitor pain and request assistance   Assess pain using appropriate pain scale   Administer analgesics based on type and severity of pain and evaluate response     Problem: Safety - Adult  Goal: Free from fall injury  12/8/2022 0858 by Melissa Albarran RN  Outcome: Progressing  12/7/2022 2355 by Yusuf Hutchinson RN  Outcome: Progressing  Flowsheets  Taken 12/7/2022 2355 by Damir Hodges RN  Free From Fall Injury: Instruct family/caregiver on patient safety  Taken 12/7/2022 1308 by Mukesh Jernigan RN  Free From Fall Injury: Instruct family/caregiver on patient safety     Problem: Skin/Tissue Integrity  Goal: Absence of new skin breakdown  Description: 1. Monitor for areas of redness and/or skin breakdown  2. Assess vascular access sites hourly  3. Every 4-6 hours minimum:  Change oxygen saturation probe site  4. Every 4-6 hours:  If on nasal continuous positive airway pressure, respiratory therapy assess nares and determine need for appliance change or resting period.   12/8/2022 0858 by Mukesh Jernigan RN  Outcome: Progressing  12/7/2022 2355 by Damir Hodges RN  Outcome: Progressing     Problem: Gastrointestinal - Adult  Goal: Minimal or absence of nausea and vomiting  12/8/2022 0858 by Mukesh Jernigan RN  Outcome: Progressing  12/7/2022 2355 by Damir Hodges RN  Outcome: Progressing  Flowsheets (Taken 12/7/2022 2355)  Minimal or absence of nausea and vomiting: Advance diet as tolerated, if ordered  Goal: Maintains or returns to baseline bowel function  12/8/2022 0858 by Mukesh Jernigan RN  Outcome: Progressing  12/7/2022 2355 by Damir Hodges RN  Outcome: Progressing     Problem: Nutrition Deficit:  Goal: Optimize nutritional status  12/8/2022 0858 by Mukesh Jernigan RN  Outcome: Progressing  12/7/2022 2355 by Damir Hodges RN  Outcome: Progressing

## 2022-12-08 NOTE — PROGRESS NOTES
Vitals and reassessment completed at this time. Patient remains sitting up in chair with no complaints at this time. Bowel sounds remains active, denies N/V. Patient denies any further needs. Call light remains within reach.

## 2022-12-08 NOTE — PROGRESS NOTES
19 staples removed at this time, steri strips placed, patient tolerates well, call light within reach.

## 2022-12-08 NOTE — PROGRESS NOTES
[facility] [facility]                       Inpatient Medication Education Note                                 Patient admitted for Bowel Obstruction    Medications reviewed with the patient include:  Warfarin      Patient education provided to include warfarin dose for discharge. Patient instructed to take warfarin 7.5 mg today and then resume home dose. He is calling his provider to schedule a follow up visit for tomorrow or Monday.     Savita Coyle, PharmD, 12/8/2022 11:23 AM

## 2022-12-08 NOTE — PROGRESS NOTES
Progress Note    SUBJECTIVE:    Patient seen for f/u of Bowel obstruction ([address] He resting in chair . Denied pain  No complaints. Passing flatus  and has had 2 BM. ROS:   Constitutional: negative  for fevers, and negative for chills. Respiratory: negative for shortness of breath, negative for cough, and negative for wheezing  Cardiovascular: negative for chest pain, and negative for palpitations  Gastrointestinal: negative for abdominal pain, negative for nausea,negative for vomiting, negative for diarrhea, and negative for constipation     All other systems were reviewed with the patient and are negative unless otherwise stated in HPI      OBJECTIVE:      Vitals:   Vitals:    12/08/22 0645   BP: (!) 142/83   Pulse: 84   Resp: 18   Temp: 97.1 °F (36.2 °C)   SpO2: 96%     Weight: 213 lb 12.8 oz (97 kg)   Height: 6' 2\" (188 cm)     Weight  Wt Readings from Last 3 Encounters:   12/07/22 213 lb 12.8 oz (97 kg)   09/26/22 225 lb (102.1 kg)   09/19/22 226 lb (102.5 kg)     Body mass index is 27.45 kg/m². 24HR INTAKE/OUTPUT:      Intake/Output Summary (Last 24 hours) at 12/8/2022 0924  Last data filed at 12/8/2022 0851  Gross per 24 hour   Intake 850 ml   Output 800 ml   Net 50 ml     -----------------------------------------------------------------  Exam:    GEN:    Awake, alert and oriented x3. EYES:  EOMI, pupils equal   NECK: Supple. No lymphadenopathy. No carotid bruit  CVS:    irregularly irregular, no audible murmur  PULM:  diminished but clear without wheezing, rales or rhonchi, no acute respiratory distress  ABD:    Bowels sounds hypoactive. Abdomen is soft. No distention. no tenderness to palpation. Dressing CDI  EXT:   no edema bilaterally . No calf tenderness. NEURO: Moves all extremities. Motor and sensory are grossly intact  SKIN:  No rashes.   No skin lesions.    -----------------------------------------------------------------    Diagnostic Data:      Complete Blood Count:   Recent Labs 12/06/22  0545 12/07/22  0555 12/08/22  0550   WBC 8.9 7.5 7.0   RBC 4.21 4.18* 4.17*   HGB 13.1 13.1 12.8*   HCT 39.8* 38.5* 38.5*   MCV 94.5 92.1 92.3   MCH 31.1 31.3 30.7   MCHC 32.9 34.0 33.2   RDW 13.2 13.1 13.0    247 263   MPV 10.9 10.6 10.7        Last 3 Blood Glucose:   Recent Labs     12/06/22  0545 12/07/22  0555 12/08/22  0550   GLUCOSE 111* 117* 96        Comprehensive Metabolic Profile:   Recent Labs     12/06/22  0545 12/07/22  0555 12/08/22  0550    140 140   K 4.4 4.4 4.7    104 104   CO2 26 27 29   BUN 12 10 10   CREATININE 0.89 0.90 0.83   GLUCOSE 111* 117* 96   CALCIUM 9.1 9.2 9.3   PROT 7.0 6.8 6.7   LABALBU 3.7 3.7 3.7   BILITOT 1.2 1.1 0.9   ALKPHOS 93 85 88   AST 25 33 40*   ALT 20 33 49*        Urinalysis:   Lab Results   Component Value Date/Time    NITRU NEGATIVE 12/03/2022 01:39 PM    COLORU Yellow 12/03/2022 01:39 PM    PHUR 5.5 12/03/2022 01:39 PM    WBCUA 0 TO 2 12/03/2022 01:39 PM    RBCUA None 12/03/2022 01:39 PM    MUCUS 3+ 12/03/2022 01:39 PM    TRICHOMONAS NOT REPORTED 06/05/2020 01:45 AM    YEAST NOT REPORTED 06/05/2020 01:45 AM    BACTERIA 1+ 12/03/2022 01:39 PM    SPECGRAV >1.030 12/03/2022 01:39 PM    LEUKOCYTESUR NEGATIVE 12/03/2022 01:39 PM    UROBILINOGEN Normal 12/03/2022 01:39 PM    BILIRUBINUR MODERATE 12/03/2022 01:39 PM    GLUCOSEU NEGATIVE 12/03/2022 01:39 PM    KETUA 1+ 12/03/2022 01:39 PM    AMORPHOUS 2+ 12/03/2022 01:39 PM       HgBA1c:  No results found for: LABA1C    Lactic Acid:   Lab Results   Component Value Date/Time    LACTA 1.6 12/03/2022 10:40 AM    LACTA 1.7 12/23/2020 08:55 PM    LACTA 2.1 06/05/2020 12:05 AM        Radiology/Imaging:  XR ABDOMEN (2 VIEWS)   Final Result   Nonspecific, nonobstructive bowel gas pattern. XR ABDOMEN FOR NG/OG/NE TUBE PLACEMENT   Final Result   NG tube placement as above. Advancement by 10 cm recommended.          CT ABDOMEN PELVIS W IV CONTRAST Additional Contrast? None   Final Result   Small bowel containing indirect right inguinal hernia with complete bowel   obstruction.                ASSESSMENT / PLAN:  Bowel obstruction ([address]  Continue current therapy   Appreciate general surgery  Postop day #4  Off IV Zosyn  NG out  ambulate  IVL  Adv Diet  PAF  Therapeutic Lovenox will restart Coumadin - will bridge if needed  Hold Coumadin  Nutrition status:   obesity, non-morbid  Dietician consult initiated  Hospital Prophylaxis:   DVT: Lovenox / Coumadin  Stress Ulcer: H2 Blocker   High risk medications: none   Disposition:    Discharge plan is home today   No ATB per surgery  Denied need for pain meds      MODE Good - CNP , APRN, NP-C  Hospitalist Medicine        12/8/2022, 9:24 AM

## 2022-12-08 NOTE — DISCHARGE SUMMARY
Discharge Summary    Desmond Llamas  :  1955  MRN:  [id number]    Admit date:  12/3/2022      Discharge date: 2022     Admitting Physician:  Lety Joseph MD    Discharge Diagnoses:    Principal Problem: Bowel obstruction (HCC)  Active Problems:    Severe malnutrition (HCC)    Paroxysmal atrial fibrillation (HCC)  Resolved Problems:    * No resolved hospital problems. *      Hospital Course:   Desmond Llamas is a 74 y.o. male admitted with bowel obstruction. He presented to the emergency room with complaints of abdominal pain and nausea and vomiting. Patient's last bowel movement was the Wednesday before arrival.  He stated he was unable to eat or drink. He complained of low-grade fever at home. Patient was evaluated in the emergency room and was found to have incarcerated hernia. Patient was admitted and taken to surgery and underwent a laparotomy exploratory with reduction of strangulated small bowel from recurrent right inguinal hernia enterectomy with side-to-side anastomosis and closure of right inguinal hernia recurrent with a retroperitoneal approach using Bard macro mesh per operative report. Postoperatively patient is done well. Hemodynamically patient is stable. Patient is taking no pain medication and doing well he is tolerating diet and having bowel movements. Patient will be discharged home today he will follow-up with Dr. Hayley Dewitt as an outpatient. Patient staples were removed today and Steri-Strips put in place per direction of surgery. Patient will take Coumadin 7.5 mg today and will return to his home usual dose tomorrow. He does state that the [place] doses his Coumadin for him and he will resume that postoperatively. No antibiotics are needed for discharge.     Consultants:   Dr. Petty Mancia, general surgery; Dr. Hayley Dewitt, general surgery    Procedures: LAPAROTOMY EXPLORATORY reduction of strangulated small bowel from recurrent right inguinal hernia enterectomy with side to side anastomosis and closure of the right inguinal hernia recurrent with a retroperitoneal approach using Bard macro mesh    Complications: none    Discharge Condition: fair    Exam:  GEN:    Awake, alert and oriented x3. EYES:   EOMI, pupils equal   NECK: Supple. No lymphadenopathy. No carotid bruit  CVS:     irregularly irregular, no audible murmur  PULM:  diminished but clear without wheezing, rales or rhonchi, no acute respiratory distress  ABD:     Bowels sounds hypoactive. Abdomen is soft. No distention. no tenderness to palpation. Dressing CDI  EXT:     no edema bilaterally . No calf tenderness. NEURO: Moves all extremities. Motor and sensory are grossly intact  SKIN:    No rashes. No skin lesions. Significant Diagnostic Studies:   Lab Results   Component Value Date    WBC 7.0 12/08/2022    HGB 12.8 (L) 12/08/2022     12/08/2022       Lab Results   Component Value Date    BUN 10 12/08/2022    CREATININE 0.83 12/08/2022     12/08/2022    K 4.7 12/08/2022    CALCIUM 9.3 12/08/2022     12/08/2022    CO2 29 12/08/2022    LABGLOM >60 12/08/2022       Lab Results   Component Value Date    WBCUA 0 TO 2 12/03/2022    RBCUA None 12/03/2022    EPITHUA None 12/03/2022    LEUKOCYTESUR NEGATIVE 12/03/2022    SPECGRAV >1.030 (H) 12/03/2022    GLUCOSEU NEGATIVE 12/03/2022    KETUA 1+ (A) 12/03/2022    PROTEINU 2+ (A) 12/03/2022    HGBUR NEGATIVE 12/03/2022    CASTUA NOT REPORTED 06/05/2020    CRYSTUA NOT REPORTED 06/05/2020    BACTERIA 1+ (A) 12/03/2022    YEAST NOT REPORTED 06/05/2020       CT ABDOMEN PELVIS W IV CONTRAST Additional Contrast? None    Result Date: 12/3/2022  EXAMINATION: CT OF THE ABDOMEN AND PELVIS WITH CONTRAST 12/3/2022 11:49 am TECHNIQUE: CT of the abdomen and pelvis was performed with the administration of intravenous contrast. Multiplanar reformatted images are provided for review.  Automated exposure control, iterative reconstruction, and/or weight based adjustment of the mA/kV was utilized to reduce the radiation dose to as low as reasonably achievable. COMPARISON: 12/23/2020 HISTORY: ORDERING SYSTEM PROVIDED HISTORY: abdominal x 3 days; nausea, vomiting, no BM x 3 days TECHNOLOGIST PROVIDED HISTORY: abdominal x 3 days; nausea, vomiting, no BM x 3 days Decision Support Exception - unselect if not a suspected or confirmed emergency medical condition->Emergency Medical Condition (MA) FINDINGS: Lower Chest: There are multiple calcified pleural plaques throughout the lung bases. Organs: The liver, pancreas, spleen, kidneys and adrenals are unremarkable. GI/Bowel: There is a 6.2 x 5.5 cm small bowel containing indirect right inguinal hernia. This results in complete bowel obstruction with moderate to severe distention of the stomach and small bowel the in to this point. The distal small bowel and colon are decompressed. Pelvis: The bladder and prostate are unremarkable. Peritoneum/Retroperitoneum: There is no free air, free fluid or intraperitoneal inflammatory change. There is no adenopathy. Bones/Soft Tissues: There is no acute fracture or aggressive osseous lesion. Small bowel containing indirect right inguinal hernia with complete bowel obstruction. XR ABDOMEN FOR NG/OG/NE TUBE PLACEMENT    Result Date: 12/3/2022  EXAMINATION: ONE SUPINE XRAY VIEW(S) OF THE ABDOMEN 12/3/2022 2:22 pm COMPARISON: None. HISTORY: ORDERING SYSTEM PROVIDED HISTORY: Confirmation of course of NG/OG/NE tube and location of tip of tube TECHNOLOGIST PROVIDED HISTORY: Confirmation of course of NG/OG/NE tube and location of tip of tube Portable? ->Yes FINDINGS: A nasogastric tube is been placed, the tip of which terminates approximately 2 cm above the left hemidiaphragm. Multiple calcified pleural plaques are again demonstrated within the lung bases. NG tube placement as above. Advancement by 10 cm recommended.      XR ABDOMEN (2 VIEWS)    Result Date: 12/4/2022  EXAMINATION: TWO XRAY VIEWS OF THE ABDOMEN 12/4/2022 8:11 am COMPARISON: None. HISTORY: ORDERING SYSTEM PROVIDED HISTORY: bowel obstruction TECHNOLOGIST PROVIDED HISTORY: bowel obstruction FINDINGS: A nasogastric tube is coiled within the gastric body. A large amount of right-sided colonic stool is noted. There are scattered collections of small bowel gas within the central abdomen. There is no free air, organomegaly or suspicious calcification. Postop changes of lower ventral abdominal hernia repair are present. Nonspecific, nonobstructive bowel gas pattern. Assessment and Plan:  Patient Active Problem List    Diagnosis Date Noted    Severe malnutrition ([facility] [address] 12/05/2022    Bowel obstruction ([facility] [address] 12/03/2022    Paraesophageal hernia /  surgery 2021 09/27/2021    Paroxysmal atrial fibrillation ([facility] [address] 10/05/2012    Mitral stenosis 10/05/2012        Discharge Medications:         Medication List        CHANGE how you take these medications      warfarin 5 MG tablet  Commonly known as: Coumadin  5 mg 5 days weekly and 7,5 mg 2 days weekly. What changed: additional instructions            CONTINUE taking these medications      furosemide 80 MG tablet  Commonly known as: LASIX     Handicap Placard Misc  by Does not apply route Duration; 3 years     magnesium oxide 400 MG tablet  Commonly known as: MAG-OX     omeprazole 20 MG delayed release capsule  Commonly known as: PRILOSEC     sulfaSALAzine 500 MG tablet  Commonly known as: AZULFIDINE     vitamin D 25 MCG (1000 UT) Caps            STOP taking these medications      ezetimibe 10 MG tablet  Commonly known as: ZETIA     predniSONE 10 MG tablet  Commonly known as: DELTASONE          Medication Instructions: Take Coumadin 7.5 mg today then resume home dose tomorrow           Patient Instructions:    Activity: activity as tolerated  Diet: regular diet  Wound Care: none needed  Other: None    Disposition:   Discharge to Home    Follow up:  Patient will be followed by Robe Aleman MD in 1-2 weeks; Dr. Zakiya Butts on Discharge (if applicable)  ACE/ARB in CHF: NA  Statin in [place]: NA  ASA in [place]: NA  Statin in CVA: NA  Antiplatelet in CVA: NA    Total time spent on discharge services: 40 minutes    Including the following activities:  Evaluation and Management of patient  Discussion with patient and/or surrogate about current care plan  Coordination with Case Management and/or   Coordination of care with Consultants (if applicable)   Coordination of care with Receiving Facility Physician (if applicable)  Completion of DME forms (if applicable)  Preparation of Discharge Summary  Preparation of Medication Reconciliation  Preparation of Discharge Prescriptions    Signed:  MODE Perdue - CNP, MODE, NP-C  12/8/2022, 11:26 AM

## 2022-12-08 NOTE — PROGRESS NOTES
Patient received discharge instructions at this time. All questions and concerns answered. Discharge summary provided for patient to submit to the [facility]. Patient discharged from the floor at this time. Patient refused a wheelchair and chose to ambulate off the floor with spouse.

## 2022-12-08 NOTE — PROGRESS NOTES
[address] [place] [address] Note-Warfarin Follow-up       Patient:  Shirley Valentine  MRN: [id number]    Warfarin consult follow-up:    INR (no units)   Date Value   12/08/2022 1.8   12/07/2022 1.5   12/06/2022 1.5   12/04/2022 1.9   12/03/2022 1.8   12/23/2020 2.6   06/04/2020 2.1       Hemoglobin (g/dL)   Date Value   12/08/2022 12.8 (L)   12/07/2022 13.1   12/06/2022 13.1     Hematocrit (%)   Date Value   12/08/2022 38.5 (L)   12/07/2022 38.5 (L)   12/06/2022 39.8 (L)     Platelets (k/uL)   Date Value   12/08/2022 263   12/07/2022 247   12/06/2022 224       Target INR Range: 2-3    Significant Drug-Drug Interactions:  New warfarin drug-drug interactions: none  Discontinued drug-drug interactions: Lovenox     Notes:      Warfarin 7.5 mg today               Daily PT/INR until stable within therapeutic range.      Ari Garcia PharmD 12/8/2022 7:18 AM

## 2022-12-08 NOTE — PROGRESS NOTES
Physical Therapy  Facility/Department: [facility] AT THE [facility] MED SURG  Daily Treatment Note  NAME: Marisela Delgado  : 1955  MRN: [id number]  Date of Service: 2022  Discharge Recommendations:  Home independently   Patient Diagnosis(es): The primary encounter diagnosis was Small bowel obstruction due to right inguinal hernia. A diagnosis of Partial intestinal obstruction, unspecified cause ([address] was also pertinent to this visit. Assessment   Assessment: Gait with no AD, Mod I, [id number] with no noted LOB and steady sonny. Reclined and seated exercises B LE x20. Transfers: Mod I. Activity Tolerance: Patient tolerated treatment well   Plan    Physcial Therapy Plan  General Plan: 2 times a day 7 days a week  Current Treatment Recommendations: Strengthening;ROM;Balance training;Functional mobility training;Transfer training;ADL/Self-care training;Gait training;Neuromuscular re-education; Safety education & training;Patient/Caregiver education & training   Restrictions  Restrictions/Precautions  Restrictions/Precautions: NPO  Required Braces or Orthoses?: No   Subjective    Subjective  Subjective: Pt. awake and in chair upon arrival, agreeable to therapy at this time. Pain: denies  Orientation  Overall Orientation Status: Within Normal Limits   Objective   Bed Mobility Training  Bed Mobility Training: No  Transfer Training  Transfer Training: Yes  Overall Level of Assistance: Modified independent  Sit to Stand: Modified independent  Stand to Sit: Modified independent  Gait Training  Gait Training: Yes  Gait  Overall Level of Assistance: Modified independent  Distance (ft):  (750ft)  PT Exercises  Exercise Treatment: Reclined and seated exercises B LE x20  Safety Devices  Type of Devices: All fall risk precautions in place; Left in chair;Call light within reach;Nurse notified   Goals  Short Term Goals  Time Frame for Short Term Goals: 3 DAYS  Short Term Goal 1: IND TRANSFERS  Short Term Goal 2: IND GAIT NO DEVICES 150 FT.   Long Term Goals  Time Frame for Long Term Goals : 2 WEEKS  Long Term Goal 1: IND GAIT 400 FT NO DEVICES.   Patient Goals   Patient Goals : RETURN HOME IND GAIT NO DEVICES  Education  Patient Education  Education Given To: Patient  Education Provided: Role of Therapy;Plan of Care;Home Exercise Program  Education Method: Verbal  Barriers to Learning: None  Education Outcome: Verbalized understanding;Demonstrated understanding  Therapy Time   Individual Concurrent Group Co-treatment   Time In 0716         Time Out 0740         Minutes 79 Heydi Foreman, PTA

## 2022-12-08 NOTE — PLAN OF CARE
Problem: Chronic Conditions and Co-morbidities  Goal: Patient's chronic conditions and co-morbidity symptoms are monitored and maintained or improved  12/8/2022 1132 by Hilary Ace RN  Outcome: Completed  12/8/2022 0858 by Hilary Ace RN  Outcome: Progressing  12/7/2022 2355 by Maddi Alvarez RN  Outcome: Progressing  Flowsheets (Taken 12/7/2022 2355)  Care Plan - Patient's Chronic Conditions and Co-Morbidity Symptoms are Monitored and Maintained or Improved: Monitor and assess patient's chronic conditions and comorbid symptoms for stability, deterioration, or improvement     Problem: Discharge Planning  Goal: Discharge to home or other facility with appropriate resources  12/8/2022 1132 by Hilary Ace RN  Outcome: Completed  12/8/2022 0858 by Hilary Ace RN  Outcome: Progressing  12/7/2022 2355 by Maddi Alvarez RN  Outcome: Progressing  Flowsheets (Taken 12/7/2022 2355)  Discharge to home or other facility with appropriate resources:   Identify barriers to discharge with patient and caregiver   Arrange for needed discharge resources and transportation as appropriate   Identify discharge learning needs (meds, wound care, etc)     Problem: Pain  Goal: Verbalizes/displays adequate comfort level or baseline comfort level  12/8/2022 1132 by Hilary Ace RN  Outcome: Completed  12/8/2022 0858 by Hilary Ace RN  Outcome: Progressing  Flowsheets (Taken 12/8/2022 0645)  Verbalizes/displays adequate comfort level or baseline comfort level: Encourage patient to monitor pain and request assistance  12/7/2022 2355 by Maddi Alvarez RN  Outcome: Progressing  Flowsheets (Taken 12/7/2022 2355)  Verbalizes/displays adequate comfort level or baseline comfort level:   Encourage patient to monitor pain and request assistance   Assess pain using appropriate pain scale   Administer analgesics based on type and severity of pain and evaluate response     Problem: Safety - Adult  Goal: Free from fall injury  12/8/2022 1132 by Eldred Dandy, RN  Outcome: Completed  12/8/2022 0858 by Eldred Dandy, RN  Outcome: Progressing  12/7/2022 2355 by Kobi Brown RN  Outcome: Progressing  Flowsheets  Taken 12/7/2022 2355 by Kobi Brown RN  Free From Fall Injury: Instruct family/caregiver on patient safety  Taken 12/7/2022 1308 by Eldred Dandy, RN  Free From Fall Injury: Instruct family/caregiver on patient safety     Problem: Skin/Tissue Integrity  Goal: Absence of new skin breakdown  Description: 1. Monitor for areas of redness and/or skin breakdown  2. Assess vascular access sites hourly  3. Every 4-6 hours minimum:  Change oxygen saturation probe site  4. Every 4-6 hours:  If on nasal continuous positive airway pressure, respiratory therapy assess nares and determine need for appliance change or resting period.   12/8/2022 1132 by Eldred Dandy, RN  Outcome: Completed  12/8/2022 0858 by Eldred Dandy, RN  Outcome: Progressing  12/7/2022 2355 by Kobi Brown RN  Outcome: Progressing     Problem: Gastrointestinal - Adult  Goal: Minimal or absence of nausea and vomiting  12/8/2022 1132 by Eldred Dandy, RN  Outcome: Completed  12/8/2022 0858 by Eldred Dandy, RN  Outcome: Progressing  12/7/2022 2355 by Kobi Brown RN  Outcome: Progressing  Flowsheets (Taken 12/7/2022 2355)  Minimal or absence of nausea and vomiting: Advance diet as tolerated, if ordered  Goal: Maintains or returns to baseline bowel function  12/8/2022 1132 by Eldred Dandy, RN  Outcome: Completed  12/8/2022 0858 by Eldred Dandy, RN  Outcome: Progressing  12/7/2022 2355 by Kobi Brown RN  Outcome: Progressing     Problem: Nutrition Deficit:  Goal: Optimize nutritional status  12/8/2022 1132 by Eldred Dandy, RN  Outcome: Completed  12/8/2022 0858 by Eldred Dandy, RN  Outcome: Progressing  12/7/2022 2355 by Kobi Brown RN  Outcome: Progressing

## 2022-12-08 NOTE — PLAN OF CARE
Problem: Chronic Conditions and Co-morbidities  Goal: Patient's chronic conditions and co-morbidity symptoms are monitored and maintained or improved  Outcome: Progressing  Flowsheets (Taken 12/7/2022 2355)  Care Plan - Patient's Chronic Conditions and Co-Morbidity Symptoms are Monitored and Maintained or Improved: Monitor and assess patient's chronic conditions and comorbid symptoms for stability, deterioration, or improvement     Problem: Discharge Planning  Goal: Discharge to home or other facility with appropriate resources  Outcome: Progressing  Flowsheets (Taken 12/7/2022 2355)  Discharge to home or other facility with appropriate resources:   Identify barriers to discharge with patient and caregiver   Arrange for needed discharge resources and transportation as appropriate   Identify discharge learning needs (meds, wound care, etc)     Problem: Pain  Goal: Verbalizes/displays adequate comfort level or baseline comfort level  Outcome: Progressing  Flowsheets (Taken 12/7/2022 2355)  Verbalizes/displays adequate comfort level or baseline comfort level:   Encourage patient to monitor pain and request assistance   Assess pain using appropriate pain scale   Administer analgesics based on type and severity of pain and evaluate response     Problem: Safety - Adult  Goal: Free from fall injury  Outcome: Progressing  Flowsheets  Taken 12/7/2022 2355 by Tereza Rosenthal RN  Free From Fall Injury: Instruct family/caregiver on patient safety  Taken 12/7/2022 1308 by Nely Orosco RN  Free From Fall Injury: Instruct family/caregiver on patient safety     Problem: Skin/Tissue Integrity  Goal: Absence of new skin breakdown  Description: 1. Monitor for areas of redness and/or skin breakdown  2. Assess vascular access sites hourly  3. Every 4-6 hours minimum:  Change oxygen saturation probe site  4.   Every 4-6 hours:  If on nasal continuous positive airway pressure, respiratory therapy assess nares and determine need for appliance change or resting period.   Outcome: Progressing     Problem: Gastrointestinal - Adult  Goal: Minimal or absence of nausea and vomiting  Outcome: Progressing  Flowsheets (Taken 12/7/2022 4900)  Minimal or absence of nausea and vomiting: Advance diet as tolerated, if ordered  Goal: Maintains or returns to baseline bowel function  Outcome: Progressing     Problem: Nutrition Deficit:  Goal: Optimize nutritional status  Outcome: Progressing

## 2022-12-08 NOTE — PROGRESS NOTES
Patient sitting on the side of bed at this time, states he slept well, no complaints, vitals and assessment done as charted, call light within reach.

## 2022-12-08 NOTE — PROGRESS NOTES
Vitals and assessment completed at this time as charted. Patient sitting up in chair with no distress noted. Dressing to abd is clean, dry and intact. Bowel sounds are active. Patient denies any N/V. Patient denies any further needs at this time. Call light remains within reach.

## 2022-12-14 ENCOUNTER — HOSPITAL ENCOUNTER (OUTPATIENT)
Age: 67
Discharge: HOME | End: 2022-12-14
Payer: MEDICARE

## 2022-12-14 DIAGNOSIS — I48.0 PAROXYSMAL ATRIAL FIBRILLATION (HCC): ICD-10-CM

## 2022-12-14 LAB
INR BLD: 2.4
PROTHROMBIN TIME: 25.5 SEC (ref 11.5–14.2)

## 2022-12-14 PROCEDURE — 36415 COLL VENOUS BLD VENIPUNCTURE: CPT

## 2022-12-14 PROCEDURE — 85610 PROTHROMBIN TIME: CPT

## 2022-12-16 ENCOUNTER — OFFICE VISIT (OUTPATIENT)
Dept: SURGERY | Age: 67
End: 2022-12-16

## 2022-12-16 VITALS
HEART RATE: 103 BPM | RESPIRATION RATE: 16 BRPM | DIASTOLIC BLOOD PRESSURE: 74 MMHG | OXYGEN SATURATION: 98 % | SYSTOLIC BLOOD PRESSURE: 121 MMHG

## 2022-12-16 DIAGNOSIS — Z09 POSTOP CHECK: Primary | ICD-10-CM

## 2022-12-16 PROCEDURE — 99024 POSTOP FOLLOW-UP VISIT: CPT | Performed by: SURGERY

## 2022-12-16 NOTE — PROGRESS NOTES
Patient is seen back status post laparotomy for incarcerated inguinal hernia. Patient did undergo bowel resection. He is seen today and has no complaints. His bowels are moving loose at times. Abdomen is soft incision is healed nicely. Patient is given the okay to drive. He is asked to continue to limit his physical activity for up to 6 weeks. I will see him back in 1 month.

## 2023-01-20 ENCOUNTER — OFFICE VISIT (OUTPATIENT)
Dept: SURGERY | Age: 68
End: 2023-01-20

## 2023-01-20 VITALS — DIASTOLIC BLOOD PRESSURE: 76 MMHG | HEART RATE: 98 BPM | RESPIRATION RATE: 16 BRPM | SYSTOLIC BLOOD PRESSURE: 138 MMHG

## 2023-01-20 DIAGNOSIS — Z09 POSTOP CHECK: Primary | ICD-10-CM

## 2023-01-20 PROCEDURE — 99024 POSTOP FOLLOW-UP VISIT: CPT | Performed by: SURGERY

## 2023-01-20 NOTE — PROGRESS NOTES
Patient is seen back for routine postop visit. He is doing well. He is given the ok to resume normal activity. F/U prn.

## 2023-01-31 PROBLEM — E43 SEVERE MALNUTRITION (HCC): Status: RESOLVED | Noted: 2022-12-05 | Resolved: 2023-01-31

## 2023-01-31 PROBLEM — K56.609 BOWEL OBSTRUCTION (HCC): Status: RESOLVED | Noted: 2022-12-03 | Resolved: 2023-01-31

## 2023-01-31 PROBLEM — K44.9 PARAESOPHAGEAL HERNIA: Status: RESOLVED | Noted: 2021-09-27 | Resolved: 2023-01-31

## 2024-04-23 DIAGNOSIS — Z95.4 S/P AORTIC VALVE ALLOGRAFT: Primary | ICD-10-CM

## 2024-04-26 ENCOUNTER — HOSPITAL ENCOUNTER (OUTPATIENT)
Dept: CARDIAC REHAB | Age: 69
Setting detail: THERAPIES SERIES
Discharge: HOME | End: 2024-04-26

## 2024-04-26 RX ORDER — METOPROLOL TARTRATE 50 MG/1
50 TABLET, FILM COATED ORAL 2 TIMES DAILY
COMMUNITY

## 2024-04-26 NOTE — PROGRESS NOTES
recovery)  Absence of complicated ventricular arrhythmias at rest  Absence of HF  Absence of signs/symptoms of post-event or post-procedure ischemia  Absence of clinical depression   Non-smoker   0-1 Uncontrolled risk factors    Fall Risk Assessment:  History of falls with or without injury  [] Yes   [x] No   Use of ambulatory aid  [] Yes  [x] No   Difficulty walking/impaired gait  [x] Yes  [] No   Numbness in feet  [x] Yes   [] No   Vision changes  [] Yes  [x] No   Dizziness  [] Yes  [x] No   Shortness of breath  [x] Yes  [] No   Medications  [x] Anticoagulant/Antiplatelet  [x] Betablocker /ACE/ARB  [] Antidepressant  [] Seizure medication   [] NA  Risk of fall  [] Low (none of above)  [] Medium (less than 3 above)  [x] High (3 or more above)       Patient 90-Day Goals: (Specific, Measurable, Achievable, Relevant, and Time-Bound)  Be less fatigued in day-to-day activities    Program Goals:   Achieve and progress prescribed exercise frequency, intensity, time, and type based upon initial evaluation and submaximal graded exercise/6MWT results as evidenced by the attaining and maintaining the prescribed target heart rate range, a Delmy rating of perceived exertion between 11 and 16, duration of 31 - 90 minutes using multiple exercise modes for at least 3 days/week, progressing at least 0.5-1.0 METs/week as tolerated, as evidenced by daily session reports and pre/post MET levels.     Introduce and progress 8-10 different bilateral UE and/or LE progressive resistance exercises focused on major muscle groups using 1-3 sets each per lift, on 2-3 non-consecutive days implementing free and machine weights and/or bands, as appropriate, with a resistance of 40-60% 1-repetition maximum or 10 -15 repetitions to progressive overload and increasing resistance once repetitions have progressed to 15 reps and feel “fairly light” on 2 prior occasions.     Develop regular home aerobic exercise program for 20 - 60 minutes at least 2

## 2024-05-02 ENCOUNTER — APPOINTMENT (OUTPATIENT)
Dept: CARDIAC REHAB | Age: 69
End: 2024-05-02
Payer: OTHER GOVERNMENT

## 2024-05-06 ENCOUNTER — APPOINTMENT (OUTPATIENT)
Dept: CARDIAC REHAB | Age: 69
End: 2024-05-06
Payer: OTHER GOVERNMENT

## 2024-05-08 ENCOUNTER — HOSPITAL ENCOUNTER (OUTPATIENT)
Dept: CARDIAC REHAB | Age: 69
Setting detail: THERAPIES SERIES
Discharge: HOME | End: 2024-05-08
Payer: OTHER GOVERNMENT

## 2024-05-08 PROCEDURE — 93798 PHYS/QHP OP CAR RHAB W/ECG: CPT

## 2024-05-09 ENCOUNTER — HOSPITAL ENCOUNTER (OUTPATIENT)
Dept: CARDIAC REHAB | Age: 69
Setting detail: THERAPIES SERIES
Discharge: HOME | End: 2024-05-09
Payer: OTHER GOVERNMENT

## 2024-05-09 PROCEDURE — 93798 PHYS/QHP OP CAR RHAB W/ECG: CPT

## 2024-05-09 NOTE — PROGRESS NOTES
Cardiopulmonary Rehab   Medical Nutrition Therapy Food Diary Evaluation    Patient Name: Ken CONLEY [place]  Registered Dietitian:  Laney Mcmillan, Dietetic Intern  Date:  5/9/2024    Dear Ken,    Thank you for sharing your information about your eating patterns, it serves not only to help me see what you are eating, but you as well.  Eating 3 meals a day is great way to start, I am pleased to see that you are doing that.  Whole grains, fruits and vegetables, along with lean meats and low fat dairy are the cornerstones of your health. It is great to see that you are very willing to make some changes to support your health! With that in mind, look below for some suggestions.    Your REAPS (Rapid Eating Assessment for Participants- short version) Score was: 27, which means: there are some ways you can make your eating habits healthier.     One change you can work on is including a serving of protein in each meal. For example, you can add some peanut butter to your oatmeal, or have a yogurt or an egg on the side. You can also add lean meats like chicken breast or turkey, fish, eggs, or foods like beans and nut butters.   Another change you can work on is choosing whole grains. Whole grains provide us with nutrients like fiber to keep us full and keep our digestive system working well, and B vitamins to give us energy. Examples of whole grains include choosing whole grain bread instead of white bread, brown rice instead of white rice, and whole grain crackers or air-popped popcorn. Oatmeal is a great source of whole grains, so it is great to see that you include oatmeal in your diet!  You are doing a great job at eating a variety of fruits and vegetables. Fruits and veggies are a great source of fiber, and other vitamins and minerals to keep us healthy. You included at least one serving of fruits or veggies in all three meals and your snack, keep up the good work!     Recommendations from the Dietitian based on your REAPS

## 2024-05-13 ENCOUNTER — HOSPITAL ENCOUNTER (OUTPATIENT)
Dept: CARDIAC REHAB | Age: 69
Setting detail: THERAPIES SERIES
Discharge: HOME | End: 2024-05-13
Payer: OTHER GOVERNMENT

## 2024-05-13 PROCEDURE — 93798 PHYS/QHP OP CAR RHAB W/ECG: CPT

## 2024-05-15 ENCOUNTER — HOSPITAL ENCOUNTER (OUTPATIENT)
Dept: CARDIAC REHAB | Age: 69
Setting detail: THERAPIES SERIES
Discharge: HOME | End: 2024-05-15
Payer: OTHER GOVERNMENT

## 2024-05-15 PROCEDURE — 93798 PHYS/QHP OP CAR RHAB W/ECG: CPT

## 2024-05-16 ENCOUNTER — APPOINTMENT (OUTPATIENT)
Dept: CARDIAC REHAB | Age: 69
End: 2024-05-16
Payer: OTHER GOVERNMENT

## 2024-05-20 ENCOUNTER — APPOINTMENT (OUTPATIENT)
Dept: CARDIAC REHAB | Age: 69
End: 2024-05-20
Payer: OTHER GOVERNMENT

## 2024-05-22 ENCOUNTER — APPOINTMENT (OUTPATIENT)
Dept: CARDIAC REHAB | Age: 69
End: 2024-05-22
Payer: OTHER GOVERNMENT

## 2024-05-23 ENCOUNTER — HOSPITAL ENCOUNTER (OUTPATIENT)
Dept: CARDIAC REHAB | Age: 69
Setting detail: THERAPIES SERIES
Discharge: HOME | End: 2024-05-23
Payer: OTHER GOVERNMENT

## 2024-05-23 PROCEDURE — 93798 PHYS/QHP OP CAR RHAB W/ECG: CPT

## 2024-05-29 ENCOUNTER — HOSPITAL ENCOUNTER (OUTPATIENT)
Dept: CARDIAC REHAB | Age: 69
Setting detail: THERAPIES SERIES
Discharge: HOME | End: 2024-05-29
Payer: OTHER GOVERNMENT

## 2024-05-29 PROCEDURE — 93798 PHYS/QHP OP CAR RHAB W/ECG: CPT

## 2024-05-30 ENCOUNTER — HOSPITAL ENCOUNTER (OUTPATIENT)
Dept: CARDIAC REHAB | Age: 69
Setting detail: THERAPIES SERIES
Discharge: HOME | End: 2024-05-30
Payer: OTHER GOVERNMENT

## 2024-05-30 PROCEDURE — 93798 PHYS/QHP OP CAR RHAB W/ECG: CPT

## 2024-06-03 ENCOUNTER — HOSPITAL ENCOUNTER (OUTPATIENT)
Dept: CARDIAC REHAB | Age: 69
Setting detail: THERAPIES SERIES
Discharge: HOME | End: 2024-06-03
Payer: MEDICARE

## 2024-06-03 PROCEDURE — 93798 PHYS/QHP OP CAR RHAB W/ECG: CPT

## 2024-06-05 ENCOUNTER — HOSPITAL ENCOUNTER (OUTPATIENT)
Dept: CARDIAC REHAB | Age: 69
Setting detail: THERAPIES SERIES
Discharge: HOME | End: 2024-06-05
Payer: MEDICARE

## 2024-06-05 PROCEDURE — 93798 PHYS/QHP OP CAR RHAB W/ECG: CPT

## 2024-06-06 ENCOUNTER — HOSPITAL ENCOUNTER (OUTPATIENT)
Dept: CARDIAC REHAB | Age: 69
Setting detail: THERAPIES SERIES
Discharge: HOME | End: 2024-06-06
Payer: MEDICARE

## 2024-06-06 PROCEDURE — 93798 PHYS/QHP OP CAR RHAB W/ECG: CPT

## 2024-06-10 ENCOUNTER — HOSPITAL ENCOUNTER (OUTPATIENT)
Dept: CARDIAC REHAB | Age: 69
Setting detail: THERAPIES SERIES
Discharge: HOME | End: 2024-06-10
Payer: MEDICARE

## 2024-06-10 PROCEDURE — 93798 PHYS/QHP OP CAR RHAB W/ECG: CPT

## 2024-06-12 ENCOUNTER — HOSPITAL ENCOUNTER (OUTPATIENT)
Dept: CARDIAC REHAB | Age: 69
Setting detail: THERAPIES SERIES
Discharge: HOME | End: 2024-06-12
Payer: MEDICARE

## 2024-06-12 PROCEDURE — 93798 PHYS/QHP OP CAR RHAB W/ECG: CPT

## 2024-06-13 ENCOUNTER — HOSPITAL ENCOUNTER (OUTPATIENT)
Dept: CARDIAC REHAB | Age: 69
Setting detail: THERAPIES SERIES
Discharge: HOME | End: 2024-06-13
Payer: MEDICARE

## 2024-06-13 PROCEDURE — 93798 PHYS/QHP OP CAR RHAB W/ECG: CPT

## 2024-06-17 ENCOUNTER — APPOINTMENT (OUTPATIENT)
Dept: CARDIAC REHAB | Age: 69
End: 2024-06-17
Payer: OTHER GOVERNMENT

## 2024-06-19 ENCOUNTER — APPOINTMENT (OUTPATIENT)
Dept: CARDIAC REHAB | Age: 69
End: 2024-06-19
Payer: OTHER GOVERNMENT

## 2024-06-20 ENCOUNTER — APPOINTMENT (OUTPATIENT)
Dept: CARDIAC REHAB | Age: 69
End: 2024-06-20
Payer: OTHER GOVERNMENT

## 2024-06-24 ENCOUNTER — HOSPITAL ENCOUNTER (OUTPATIENT)
Dept: CARDIAC REHAB | Age: 69
Setting detail: THERAPIES SERIES
Discharge: HOME | End: 2024-06-24
Payer: OTHER GOVERNMENT

## 2024-06-24 PROCEDURE — 93798 PHYS/QHP OP CAR RHAB W/ECG: CPT

## 2024-06-26 ENCOUNTER — HOSPITAL ENCOUNTER (OUTPATIENT)
Dept: CARDIAC REHAB | Age: 69
Setting detail: THERAPIES SERIES
Discharge: HOME | End: 2024-06-26
Payer: OTHER GOVERNMENT

## 2024-06-26 PROCEDURE — 93798 PHYS/QHP OP CAR RHAB W/ECG: CPT

## 2024-06-27 ENCOUNTER — HOSPITAL ENCOUNTER (OUTPATIENT)
Dept: CARDIAC REHAB | Age: 69
Setting detail: THERAPIES SERIES
Discharge: HOME | End: 2024-06-27
Payer: OTHER GOVERNMENT

## 2024-06-27 PROCEDURE — 93798 PHYS/QHP OP CAR RHAB W/ECG: CPT

## 2024-07-01 ENCOUNTER — HOSPITAL ENCOUNTER (OUTPATIENT)
Dept: CARDIAC REHAB | Age: 69
Setting detail: THERAPIES SERIES
Discharge: HOME | End: 2024-07-01
Payer: OTHER GOVERNMENT

## 2024-07-01 PROCEDURE — 93798 PHYS/QHP OP CAR RHAB W/ECG: CPT

## 2024-07-03 ENCOUNTER — APPOINTMENT (OUTPATIENT)
Dept: CARDIAC REHAB | Age: 69
End: 2024-07-03
Payer: OTHER GOVERNMENT

## 2024-07-08 ENCOUNTER — HOSPITAL ENCOUNTER (OUTPATIENT)
Dept: CARDIAC REHAB | Age: 69
Setting detail: THERAPIES SERIES
Discharge: HOME | End: 2024-07-08
Payer: OTHER GOVERNMENT

## 2024-07-08 PROCEDURE — 93798 PHYS/QHP OP CAR RHAB W/ECG: CPT

## 2024-07-10 ENCOUNTER — HOSPITAL ENCOUNTER (OUTPATIENT)
Dept: CARDIAC REHAB | Age: 69
Setting detail: THERAPIES SERIES
Discharge: HOME | End: 2024-07-10
Payer: OTHER GOVERNMENT

## 2024-07-10 PROCEDURE — 93798 PHYS/QHP OP CAR RHAB W/ECG: CPT

## 2024-07-11 ENCOUNTER — HOSPITAL ENCOUNTER (OUTPATIENT)
Dept: CARDIAC REHAB | Age: 69
Setting detail: THERAPIES SERIES
Discharge: HOME | End: 2024-07-11
Payer: OTHER GOVERNMENT

## 2024-07-11 PROCEDURE — 93798 PHYS/QHP OP CAR RHAB W/ECG: CPT

## 2024-07-15 ENCOUNTER — HOSPITAL ENCOUNTER (OUTPATIENT)
Dept: CARDIAC REHAB | Age: 69
Setting detail: THERAPIES SERIES
Discharge: HOME | End: 2024-07-15
Payer: OTHER GOVERNMENT

## 2024-07-15 PROCEDURE — 93798 PHYS/QHP OP CAR RHAB W/ECG: CPT

## 2024-07-17 ENCOUNTER — HOSPITAL ENCOUNTER (OUTPATIENT)
Dept: CARDIAC REHAB | Age: 69
Setting detail: THERAPIES SERIES
Discharge: HOME | End: 2024-07-17
Payer: OTHER GOVERNMENT

## 2024-07-17 PROCEDURE — 93798 PHYS/QHP OP CAR RHAB W/ECG: CPT

## 2024-07-18 ENCOUNTER — HOSPITAL ENCOUNTER (OUTPATIENT)
Dept: CARDIAC REHAB | Age: 69
Setting detail: THERAPIES SERIES
Discharge: HOME | End: 2024-07-18
Payer: OTHER GOVERNMENT

## 2024-07-18 PROCEDURE — 93798 PHYS/QHP OP CAR RHAB W/ECG: CPT

## 2024-07-22 ENCOUNTER — APPOINTMENT (OUTPATIENT)
Dept: CARDIAC REHAB | Age: 69
End: 2024-07-22
Payer: OTHER GOVERNMENT

## 2024-07-24 ENCOUNTER — HOSPITAL ENCOUNTER (OUTPATIENT)
Dept: CARDIAC REHAB | Age: 69
Setting detail: THERAPIES SERIES
Discharge: HOME | End: 2024-07-24
Payer: OTHER GOVERNMENT

## 2024-07-24 PROCEDURE — 93798 PHYS/QHP OP CAR RHAB W/ECG: CPT

## 2024-07-25 ENCOUNTER — HOSPITAL ENCOUNTER (OUTPATIENT)
Dept: CARDIAC REHAB | Age: 69
Setting detail: THERAPIES SERIES
Discharge: HOME | End: 2024-07-25
Payer: OTHER GOVERNMENT

## 2024-07-25 PROCEDURE — 93798 PHYS/QHP OP CAR RHAB W/ECG: CPT

## 2024-07-29 ENCOUNTER — HOSPITAL ENCOUNTER (OUTPATIENT)
Dept: CARDIAC REHAB | Age: 69
Setting detail: THERAPIES SERIES
Discharge: HOME | End: 2024-07-29
Payer: OTHER GOVERNMENT

## 2024-07-29 PROCEDURE — 93798 PHYS/QHP OP CAR RHAB W/ECG: CPT

## 2024-07-31 ENCOUNTER — APPOINTMENT (OUTPATIENT)
Dept: CARDIAC REHAB | Age: 69
End: 2024-07-31
Payer: OTHER GOVERNMENT

## 2024-08-01 ENCOUNTER — HOSPITAL ENCOUNTER (OUTPATIENT)
Dept: CARDIAC REHAB | Age: 69
Setting detail: THERAPIES SERIES
Discharge: HOME | End: 2024-08-01
Payer: OTHER GOVERNMENT

## 2024-08-01 PROCEDURE — 93798 PHYS/QHP OP CAR RHAB W/ECG: CPT

## 2024-08-05 ENCOUNTER — HOSPITAL ENCOUNTER (OUTPATIENT)
Dept: CARDIAC REHAB | Age: 69
Setting detail: THERAPIES SERIES
Discharge: HOME | End: 2024-08-05
Payer: OTHER GOVERNMENT

## 2024-08-05 PROCEDURE — 93798 PHYS/QHP OP CAR RHAB W/ECG: CPT

## 2024-08-07 ENCOUNTER — HOSPITAL ENCOUNTER (OUTPATIENT)
Dept: CARDIAC REHAB | Age: 69
Setting detail: THERAPIES SERIES
Discharge: HOME | End: 2024-08-07
Payer: OTHER GOVERNMENT

## 2024-08-07 PROCEDURE — 93798 PHYS/QHP OP CAR RHAB W/ECG: CPT

## 2024-08-08 ENCOUNTER — HOSPITAL ENCOUNTER (OUTPATIENT)
Dept: CARDIAC REHAB | Age: 69
Setting detail: THERAPIES SERIES
Discharge: HOME | End: 2024-08-08
Payer: OTHER GOVERNMENT

## 2024-08-08 PROCEDURE — 93798 PHYS/QHP OP CAR RHAB W/ECG: CPT

## 2024-08-12 ENCOUNTER — HOSPITAL ENCOUNTER (OUTPATIENT)
Dept: CARDIAC REHAB | Age: 69
Setting detail: THERAPIES SERIES
Discharge: HOME | End: 2024-08-12
Payer: OTHER GOVERNMENT

## 2024-08-12 PROCEDURE — 93798 PHYS/QHP OP CAR RHAB W/ECG: CPT

## 2024-08-13 ENCOUNTER — HOSPITAL ENCOUNTER (OUTPATIENT)
Dept: CARDIAC REHAB | Age: 69
Setting detail: THERAPIES SERIES
Discharge: HOME | End: 2024-08-13
Payer: OTHER GOVERNMENT

## 2024-08-13 PROCEDURE — 93798 PHYS/QHP OP CAR RHAB W/ECG: CPT

## 2024-08-14 ENCOUNTER — HOSPITAL ENCOUNTER (OUTPATIENT)
Dept: CARDIAC REHAB | Age: 69
Setting detail: THERAPIES SERIES
Discharge: HOME | End: 2024-08-14
Payer: OTHER GOVERNMENT

## 2024-08-14 PROCEDURE — 93798 PHYS/QHP OP CAR RHAB W/ECG: CPT

## 2024-08-19 ENCOUNTER — HOSPITAL ENCOUNTER (OUTPATIENT)
Dept: CARDIAC REHAB | Age: 69
Setting detail: THERAPIES SERIES
Discharge: HOME | End: 2024-08-19
Payer: OTHER GOVERNMENT

## 2024-08-19 PROCEDURE — 93798 PHYS/QHP OP CAR RHAB W/ECG: CPT

## 2024-08-21 ENCOUNTER — HOSPITAL ENCOUNTER (OUTPATIENT)
Dept: CARDIAC REHAB | Age: 69
Setting detail: THERAPIES SERIES
Discharge: HOME | End: 2024-08-21
Payer: OTHER GOVERNMENT

## 2024-08-21 PROCEDURE — 93798 PHYS/QHP OP CAR RHAB W/ECG: CPT

## 2024-08-22 ENCOUNTER — HOSPITAL ENCOUNTER (OUTPATIENT)
Dept: CARDIAC REHAB | Age: 69
Setting detail: THERAPIES SERIES
Discharge: HOME | End: 2024-08-22
Payer: OTHER GOVERNMENT

## 2024-08-22 PROCEDURE — 93798 PHYS/QHP OP CAR RHAB W/ECG: CPT

## 2024-08-26 ENCOUNTER — HOSPITAL ENCOUNTER (OUTPATIENT)
Dept: CARDIAC REHAB | Age: 69
Setting detail: THERAPIES SERIES
Discharge: HOME | End: 2024-08-26
Payer: OTHER GOVERNMENT

## 2024-08-26 PROCEDURE — 93798 PHYS/QHP OP CAR RHAB W/ECG: CPT

## 2024-08-28 ENCOUNTER — APPOINTMENT (OUTPATIENT)
Dept: CARDIAC REHAB | Age: 69
End: 2024-08-28
Payer: OTHER GOVERNMENT

## 2024-08-29 ENCOUNTER — APPOINTMENT (OUTPATIENT)
Dept: CARDIAC REHAB | Age: 69
End: 2024-08-29
Payer: OTHER GOVERNMENT

## 2024-09-23 PROBLEM — D68.9 COAGULATION DEFECT, UNSPECIFIED (HCC): Status: ACTIVE | Noted: 2024-09-23

## 2024-10-01 ENCOUNTER — HOSPITAL ENCOUNTER (OUTPATIENT)
Age: 69
Discharge: HOME | End: 2024-10-01
Payer: MEDICARE

## 2024-10-01 ENCOUNTER — HOSPITAL ENCOUNTER (OUTPATIENT)
Dept: GENERAL RADIOLOGY | Age: 69
Discharge: HOME | End: 2024-10-03
Payer: MEDICARE

## 2024-10-01 DIAGNOSIS — K44.9 HIATAL HERNIA: ICD-10-CM

## 2024-10-01 DIAGNOSIS — I48.0 PAROXYSMAL ATRIAL FIBRILLATION (HCC): ICD-10-CM

## 2024-10-01 LAB
BASOPHILS # BLD: 0.03 K/UL (ref 0–0.2)
BASOPHILS NFR BLD: 0 % (ref 0–2)
EOSINOPHIL # BLD: 0.21 K/UL (ref 0–0.4)
EOSINOPHILS RELATIVE PERCENT: 3 % (ref 0–5)
ERYTHROCYTE [DISTWIDTH] IN BLOOD BY AUTOMATED COUNT: 13.4 % (ref 12.1–15.2)
HCT VFR BLD AUTO: 38.2 % (ref 41–53)
HGB BLD-MCNC: 12.9 G/DL (ref 13.5–17.5)
IMM GRANULOCYTES # BLD AUTO: 0.01 K/UL (ref 0–0.3)
IMM GRANULOCYTES NFR BLD: 0 % (ref 0–5)
LYMPHOCYTES NFR BLD: 1.04 K/UL (ref 1–4.8)
LYMPHOCYTES RELATIVE PERCENT: 14 % (ref 13–44)
MCH RBC QN AUTO: 31.3 PG (ref 26–34)
MCHC RBC AUTO-ENTMCNC: 33.8 G/DL (ref 31–37)
MCV RBC AUTO: 92.7 FL (ref 80–100)
MONOCYTES NFR BLD: 0.7 K/UL (ref 0–1)
MONOCYTES NFR BLD: 10 % (ref 5–9)
NEUTROPHILS NFR BLD: 73 % (ref 39–75)
NEUTS SEG NFR BLD: 5.25 K/UL (ref 2.1–6.5)
PLATELET # BLD AUTO: 198 K/UL (ref 140–450)
PMV BLD AUTO: 10 FL (ref 6–12)
RBC # BLD AUTO: 4.12 M/UL (ref 4.5–5.9)
WBC OTHER # BLD: 7.2 K/UL (ref 3.5–11)

## 2024-10-01 PROCEDURE — 2500000003 HC RX 250 WO HCPCS: Performed by: INTERNAL MEDICINE

## 2024-10-01 PROCEDURE — 36415 COLL VENOUS BLD VENIPUNCTURE: CPT

## 2024-10-01 PROCEDURE — 85025 COMPLETE CBC W/AUTO DIFF WBC: CPT

## 2024-10-01 PROCEDURE — 6370000000 HC RX 637 (ALT 250 FOR IP): Performed by: INTERNAL MEDICINE

## 2024-10-01 PROCEDURE — 74220 X-RAY XM ESOPHAGUS 1CNTRST: CPT

## 2024-10-01 RX ADMIN — ANTACID/ANTIFLATULENT 1 EACH: 380; 550; 10; 10 GRANULE, EFFERVESCENT ORAL at 12:09

## 2024-10-01 RX ADMIN — BARIUM SULFATE 176 G: 960 POWDER, FOR SUSPENSION ORAL at 12:10

## 2024-10-01 RX ADMIN — BARIUM SULFATE 140 ML: 980 POWDER, FOR SUSPENSION ORAL at 12:10

## 2024-10-01 RX ADMIN — BARIUM SULFATE 1 TABLET: 700 TABLET ORAL at 12:10

## (undated) DEVICE — SUTURE COAT VCRL + LIGAPAK LIG REEL 54 IN SZ 0 UD BRAID VCP287G

## (undated) DEVICE — ENDOSCOPIC KIT CLN SWAB MICROFIBER CLTH SCP CLEANOR DISP

## (undated) DEVICE — SUTURE VCRL + SZ 3-0 L18IN ABSRB UD SH 1/2 CIR TAPERCUT NDL VCP864D

## (undated) DEVICE — SUTURE VCRL + SZ 3-0 L27IN ABSRB UD L26MM SH 1/2 CIR VCP416H

## (undated) DEVICE — SEALER ENDOSCP NANO COAT OPN DIV CRV L JAW LIGASURE IMPACT

## (undated) DEVICE — YANKAUER,FLEXIBLE HANDLE,REGLR CAPACITY: Brand: MEDLINE INDUSTRIES, INC.

## (undated) DEVICE — RELOAD STPL L55MM OPN H3.8MM CLS H1.5MM WIRE DIA0.2MM REG

## (undated) DEVICE — TOTAL TRAY, DB, 100% SILI FOLEY, 16FR 10: Brand: MEDLINE

## (undated) DEVICE — SUTURE PERMAHAND SZ 3-0 L18IN NONABSORBABLE BLK L26MM SH C013D

## (undated) DEVICE — Device

## (undated) DEVICE — STAPLER INT L60MM REG TISS BLU B FRM 8 FIRING 2 ROW AUTO

## (undated) DEVICE — TOWEL,OR,DSP,ST,BLUE,STD,4/PK,20PK/CS: Brand: MEDLINE

## (undated) DEVICE — TUBING, SUCTION, 9/32" X 12', STRAIGHT: Brand: MEDLINE INDUSTRIES, INC.

## (undated) DEVICE — SUTURE VCRL + SZ 2-0 L27IN ABSRB WHT SH 1/2 CIR TAPERCUT VCP417H

## (undated) DEVICE — 3M™ PRECISE™ VISTA DISPOSABLE SKIN STAPLER 3995: Brand: 3M™ PRECISE™

## (undated) DEVICE — GAMMEX® NON-LATEX PI ORTHO SIZE 7, STERILE POLYISOPRENE POWDER-FREE SURGICAL GLOVE: Brand: GAMMEX

## (undated) DEVICE — SYRINGE MED 10ML LUERLOCK TIP W/O SFTY DISP

## (undated) DEVICE — PAD,NON-ADHERENT,3X8,STERILE,LF,1/PK: Brand: MEDLINE

## (undated) DEVICE — COVER LT HNDL BLU PLAS

## (undated) DEVICE — 1840 FOAM BLOCK NEEDLE COUNTER: Brand: DEVON

## (undated) DEVICE — SPONGE: SPECIALTY PEANUT XR 100/CS: Brand: MEDICAL ACTION INDUSTRIES

## (undated) DEVICE — STAPLER INT L55MM CUT LN L53MM STPL LN L57MM BLU B FRM 8

## (undated) DEVICE — CHLORAPREP 26ML ORANGE

## (undated) DEVICE — SUTURE PDS II SZ 0 L60IN ABSRB VLT L48MM CTX 1/2 CIR Z990G

## (undated) DEVICE — PENCIL ES L3M BTTN SWCH HOLSTER W/ BLDE ELECTRD EDGE

## (undated) DEVICE — DEVICE FIX 5MM TI FOR LAP HERN REP PROTACK

## (undated) DEVICE — SOLUTION IV IRRIG POUR BRL 0.9% SODIUM CHL 2F7124